# Patient Record
Sex: FEMALE | Race: WHITE | NOT HISPANIC OR LATINO
[De-identification: names, ages, dates, MRNs, and addresses within clinical notes are randomized per-mention and may not be internally consistent; named-entity substitution may affect disease eponyms.]

---

## 2018-05-25 ENCOUNTER — TRANSCRIPTION ENCOUNTER (OUTPATIENT)
Age: 28
End: 2018-05-25

## 2018-10-12 ENCOUNTER — TRANSCRIPTION ENCOUNTER (OUTPATIENT)
Age: 28
End: 2018-10-12

## 2019-01-09 ENCOUNTER — TRANSCRIPTION ENCOUNTER (OUTPATIENT)
Age: 29
End: 2019-01-09

## 2019-01-14 ENCOUNTER — TRANSCRIPTION ENCOUNTER (OUTPATIENT)
Age: 29
End: 2019-01-14

## 2019-02-14 PROBLEM — Z00.00 ENCOUNTER FOR PREVENTIVE HEALTH EXAMINATION: Status: ACTIVE | Noted: 2019-02-14

## 2019-04-22 ENCOUNTER — TRANSCRIPTION ENCOUNTER (OUTPATIENT)
Age: 29
End: 2019-04-22

## 2019-04-22 ENCOUNTER — EMERGENCY (EMERGENCY)
Facility: HOSPITAL | Age: 29
LOS: 0 days | Discharge: HOME | End: 2019-04-22
Attending: EMERGENCY MEDICINE | Admitting: EMERGENCY MEDICINE
Payer: MEDICAID

## 2019-04-22 VITALS
WEIGHT: 119.93 LBS | SYSTOLIC BLOOD PRESSURE: 101 MMHG | OXYGEN SATURATION: 98 % | HEART RATE: 72 BPM | DIASTOLIC BLOOD PRESSURE: 67 MMHG | RESPIRATION RATE: 18 BRPM | TEMPERATURE: 98 F | HEIGHT: 65 IN

## 2019-04-22 DIAGNOSIS — R51 HEADACHE: ICD-10-CM

## 2019-04-22 PROCEDURE — 99284 EMERGENCY DEPT VISIT MOD MDM: CPT

## 2019-04-22 PROCEDURE — 70450 CT HEAD/BRAIN W/O DYE: CPT | Mod: 26

## 2019-04-22 NOTE — ED PROVIDER NOTE - CARE PROVIDER_API CALL
Bowen Mccloud)  Neurology  [address] [place] [address]  Phone: [phone]  Fax: [phone]  Follow Up Time:     John Campos)  Otolaryngology  [address] [place] [address], [address] [place] [id number]  Phone: [phone]  Fax: [phone]  Follow Up Time:

## 2019-04-22 NOTE — ED PROVIDER NOTE - CARE PROVIDERS DIRECT ADDRESSES
,DirectAddress_Unknown,[email or web address]

## 2019-04-22 NOTE — ED PROVIDER NOTE - ATTENDING CONTRIBUTION TO CARE
Pt with right occipital h/a radiating to retroorbital area for years but now with ear pain, referred to ENT but no appointment for a month so urgent care sent to ED for CT, PE as above, imaging reviewed, will d/c to f/u with ENT and neuro. Patient counseled regarding conditions which should prompt return.

## 2019-04-22 NOTE — ED PROVIDER NOTE - NS ED ROS FT
Review of Systems:  	•	CONSTITUTIONAL - no fever, no diaphoresis, no chills  	•	SKIN - no rash  	•	HEMATOLOGIC - no bleeding, no bruising  	•	EYES - no eye pain, no blurry vision  	•	ENT - no change in hearing, no sore throat, no ear pain or tinnitus  	•	RESPIRATORY - no shortness of breath, no cough  	•	CARDIAC - no chest pain, no palpitations  	•	GI - no abd pain, no nausea, no vomiting, no diarrhea, no constipation  	•	GENITO-URINARY - no discharge, no dysuria; no hematuria, no increased urinary frequency  	•	MUSCULOSKELETAL - no joint paint, no swelling, no redness  	•	NEUROLOGIC - no weakness, headache, no paresthesias, no LOC  	•	PSYCH - no anxiety, non suicidal, non homicidal, no hallucination, no depression

## 2019-04-22 NOTE — ED PROVIDER NOTE - PHYSICAL EXAMINATION
--EXAM--  VITAL SIGNS: I have reviewed vs documented at present.  CONSTITUTIONAL: Well-developed; well-nourished; in no acute distress.   SKIN: Warm and dry, no acute rash.   HEAD: Normocephalic; atraumatic.  EYES: PERRL, EOM intact; conjunctiva and sclera clear. No nystagmus.  ENT: No nasal discharge; airway clear.  NECK: Supple; non tender.  CARD: S1, S2, Regular rate and rhythm.   RESP: No wheezes, rales or rhonchi.  ABD: Normal bowel sounds; soft; non-distended; non-tender.  EXT: Normal ROM.   NEURO: Alert, oriented, grossly unremarkable. Strength 5/5 in all extremities. Sensation intact throughout.  PSYCH: Cooperative, appropriate. --EXAM--  VITAL SIGNS: I have reviewed vs documented at present.  CONSTITUTIONAL: Well-developed; well-nourished; in no acute distress.   SKIN: Warm and dry, no acute rash.   HEAD: Normocephalic; atraumatic.  EYES: PERRL, EOM intact; conjunctiva and sclera clear. No nystagmus.  ENT: No nasal discharge; airway clear, TMs normal  NECK: Supple; non tender.  CARD: S1, S2, Regular rate and rhythm.   RESP: No wheezes, rales or rhonchi.  ABD: Normal bowel sounds; soft; non-distended; non-tender.  EXT: Normal ROM.   NEURO: Alert, oriented, grossly unremarkable. Strength 5/5 in all extremities. Sensation intact throughout.  PSYCH: Cooperative, appropriate.

## 2019-04-22 NOTE — ED PROVIDER NOTE - OBJECTIVE STATEMENT
this is a 28 yo female presents to ed for evaluation of headache. patient states that she has had headaches for years. patient went to urgent care and was told to come to ed for further evaluation

## 2019-04-22 NOTE — ED ADULT NURSE NOTE - NSIMPLEMENTINTERV_GEN_ALL_ED
Implemented All Universal Safety Interventions:  [place] to call system. Call bell, personal items and telephone within reach. Instruct patient to call for assistance. Room bathroom lighting operational. Non-slip footwear when patient is off stretcher. Physically safe environment: no spills, clutter or unnecessary equipment. Stretcher in lowest position, wheels locked, appropriate side rails in place.

## 2019-04-23 ENCOUNTER — APPOINTMENT (OUTPATIENT)
Dept: NEPHROLOGY | Facility: CLINIC | Age: 29
End: 2019-04-23

## 2019-05-07 ENCOUNTER — OTHER (OUTPATIENT)
Age: 29
End: 2019-05-07

## 2019-05-17 ENCOUNTER — TRANSCRIPTION ENCOUNTER (OUTPATIENT)
Age: 29
End: 2019-05-17

## 2019-05-17 ENCOUNTER — APPOINTMENT (OUTPATIENT)
Dept: PULMONOLOGY | Facility: CLINIC | Age: 29
End: 2019-05-17

## 2019-06-17 ENCOUNTER — TRANSCRIPTION ENCOUNTER (OUTPATIENT)
Age: 29
End: 2019-06-17

## 2019-08-01 PROBLEM — R51 HEADACHE: Chronic | Status: ACTIVE | Noted: 2019-04-22

## 2019-08-16 ENCOUNTER — TRANSCRIPTION ENCOUNTER (OUTPATIENT)
Age: 29
End: 2019-08-16

## 2019-10-07 ENCOUNTER — APPOINTMENT (OUTPATIENT)
Dept: OTOLARYNGOLOGY | Facility: CLINIC | Age: 29
End: 2019-10-07

## 2019-11-15 ENCOUNTER — OUTPATIENT (OUTPATIENT)
Dept: OUTPATIENT SERVICES | Facility: HOSPITAL | Age: 29
LOS: 1 days | Discharge: HOME | End: 2019-11-15

## 2019-11-15 ENCOUNTER — APPOINTMENT (OUTPATIENT)
Dept: GYNECOLOGIC ONCOLOGY | Facility: CLINIC | Age: 29
End: 2019-11-15
Payer: MEDICAID

## 2019-11-15 DIAGNOSIS — Z78.9 OTHER SPECIFIED HEALTH STATUS: ICD-10-CM

## 2019-11-15 PROCEDURE — 99205 OFFICE O/P NEW HI 60 MIN: CPT

## 2019-11-15 NOTE — ASSESSMENT
[FreeTextEntry1] : 28yo P3 with complex ovarian cyst and elevated ,\par \par -repeat  with TVUS\par -RTC in 2wks\par -if persistent cyst with rising  consider laparoscopic cystectomy

## 2019-11-15 NOTE — HISTORY OF PRESENT ILLNESS
[FreeTextEntry1] : 28yo P3 with elevated CA-125 and persistent L ovarian cyst.\par \par Imaging:\par 7/29/19 nl L and R ovary without cyst\par 8/6/19 L ovarian complex cyst 1.8x2.5x0.74cm\par 10/3/19 L ovarian cyst [id number], septated, low level echoes, consistent with cystadnoma\par \par Labwork:\par 7/18/19  49.4\par 9/20/19  71.9

## 2019-11-16 LAB — CANCER AG125 SERPL-ACNC: 84 U/ML

## 2019-11-19 ENCOUNTER — OUTPATIENT (OUTPATIENT)
Dept: OUTPATIENT SERVICES | Facility: HOSPITAL | Age: 29
LOS: 1 days | Discharge: HOME | End: 2019-11-19
Payer: MEDICAID

## 2019-11-19 DIAGNOSIS — N83.209 UNSPECIFIED OVARIAN CYST, UNSPECIFIED SIDE: ICD-10-CM

## 2019-11-19 PROCEDURE — 76830 TRANSVAGINAL US NON-OB: CPT | Mod: 26

## 2019-11-20 DIAGNOSIS — N83.209 UNSPECIFIED OVARIAN CYST, UNSPECIFIED SIDE: ICD-10-CM

## 2019-11-29 ENCOUNTER — OUTPATIENT (OUTPATIENT)
Dept: OUTPATIENT SERVICES | Facility: HOSPITAL | Age: 29
LOS: 1 days | Discharge: HOME | End: 2019-11-29

## 2019-11-29 ENCOUNTER — APPOINTMENT (OUTPATIENT)
Dept: GYNECOLOGIC ONCOLOGY | Facility: CLINIC | Age: 29
End: 2019-11-29
Payer: MEDICAID

## 2019-11-29 VITALS
WEIGHT: 126 LBS | HEIGHT: 65 IN | DIASTOLIC BLOOD PRESSURE: 62 MMHG | BODY MASS INDEX: 20.99 KG/M2 | HEART RATE: 76 BPM | RESPIRATION RATE: 14 BRPM | SYSTOLIC BLOOD PRESSURE: 106 MMHG

## 2019-11-29 DIAGNOSIS — N83.209 UNSPECIFIED OVARIAN CYST, UNSPECIFIED SIDE: ICD-10-CM

## 2019-11-29 PROCEDURE — 99214 OFFICE O/P EST MOD 30 MIN: CPT

## 2019-11-29 NOTE — HISTORY OF PRESENT ILLNESS
[FreeTextEntry1] : 28yo P3 with elevated CA-125 and persistent L ovarian cyst here for follow up\par \par Imaging:\par 7/29/19 nl L and R ovary without cyst\par 8/6/19 L ovarian complex cyst 1.8x2.5x0.74cm\par 10/3/19 L ovarian cyst [id number], septated, low level echoes, consistent with cystadenoma\par 11/20/19 L ovarian cyst 2cm with left ovarian hypoechoic structure 2.9cm\par \par Labwork:\par 7/18/19  49.4\par 9/20/19  71.9\par 11/15/19  84

## 2019-11-29 NOTE — ASSESSMENT
[FreeTextEntry1] : 30yo P3 with complex ovarian cyst and elevated , likely endometriosis\par \par -R/B/A of diagnostic laparoscopy with left ovarian cystectomy discussed. All questions answered. Scheduled for 1/20/2020\par -PST

## 2019-12-03 DIAGNOSIS — N83.209 UNSPECIFIED OVARIAN CYST, UNSPECIFIED SIDE: ICD-10-CM

## 2019-12-06 ENCOUNTER — TRANSCRIPTION ENCOUNTER (OUTPATIENT)
Age: 29
End: 2019-12-06

## 2019-12-10 ENCOUNTER — APPOINTMENT (OUTPATIENT)
Dept: GYNECOLOGIC ONCOLOGY | Facility: CLINIC | Age: 29
End: 2019-12-10

## 2019-12-17 ENCOUNTER — APPOINTMENT (OUTPATIENT)
Dept: OTOLARYNGOLOGY | Facility: CLINIC | Age: 29
End: 2019-12-17
Payer: MEDICAID

## 2019-12-17 VITALS
WEIGHT: 126 LBS | DIASTOLIC BLOOD PRESSURE: 77 MMHG | HEIGHT: 65 IN | BODY MASS INDEX: 20.99 KG/M2 | SYSTOLIC BLOOD PRESSURE: 110 MMHG

## 2019-12-17 DIAGNOSIS — J00 ACUTE NASOPHARYNGITIS [COMMON COLD]: ICD-10-CM

## 2019-12-17 DIAGNOSIS — R51 HEADACHE: ICD-10-CM

## 2019-12-17 DIAGNOSIS — R42 DIZZINESS AND GIDDINESS: ICD-10-CM

## 2019-12-17 DIAGNOSIS — H92.09 OTALGIA, UNSPECIFIED EAR: ICD-10-CM

## 2019-12-17 PROCEDURE — 31231 NASAL ENDOSCOPY DX: CPT

## 2019-12-17 PROCEDURE — 99204 OFFICE O/P NEW MOD 45 MIN: CPT | Mod: 25

## 2019-12-17 PROCEDURE — 92550 TYMPANOMETRY & REFLEX THRESH: CPT

## 2019-12-17 PROCEDURE — 92557 COMPREHENSIVE HEARING TEST: CPT

## 2019-12-17 NOTE — PHYSICAL EXAM
[Midline] : trachea located in midline position [Normal] : no rashes [] : Midkiff-Hallpike test is negative

## 2019-12-17 NOTE — HISTORY OF PRESENT ILLNESS
[de-identified] : 29 Year old female here today as a new patient c/o otalgia, dizziness and ear popping. it has been going on for a few months. spinning and imbalance. \par Pt completed audiogram today; results. Patient admits otalgia for 1 year on and off. She has improvement with nasal spray. \par \par She also complains of right sided nasal blockage. runny nose at times, clear. \par \par Patient was diagnosed with a cervical herniated disk.

## 2020-01-06 ENCOUNTER — OUTPATIENT (OUTPATIENT)
Dept: OUTPATIENT SERVICES | Facility: HOSPITAL | Age: 30
LOS: 1 days | Discharge: HOME | End: 2020-01-06
Payer: MEDICAID

## 2020-01-06 VITALS
SYSTOLIC BLOOD PRESSURE: 129 MMHG | RESPIRATION RATE: 16 BRPM | OXYGEN SATURATION: 100 % | TEMPERATURE: 98 F | DIASTOLIC BLOOD PRESSURE: 56 MMHG | WEIGHT: 121.47 LBS | HEART RATE: 64 BPM

## 2020-01-06 DIAGNOSIS — N83.209 UNSPECIFIED OVARIAN CYST, UNSPECIFIED SIDE: ICD-10-CM

## 2020-01-06 DIAGNOSIS — Z01.818 ENCOUNTER FOR OTHER PREPROCEDURAL EXAMINATION: ICD-10-CM

## 2020-01-06 LAB
ALBUMIN SERPL ELPH-MCNC: 4.3 G/DL — SIGNIFICANT CHANGE UP (ref 3.5–5.2)
ALP SERPL-CCNC: 70 U/L — SIGNIFICANT CHANGE UP (ref 30–115)
ALT FLD-CCNC: 6 U/L — SIGNIFICANT CHANGE UP (ref 0–41)
ANION GAP SERPL CALC-SCNC: 13 MMOL/L — SIGNIFICANT CHANGE UP (ref 7–14)
APPEARANCE UR: CLEAR — SIGNIFICANT CHANGE UP
APTT BLD: 32.9 SEC — SIGNIFICANT CHANGE UP (ref 27–39.2)
AST SERPL-CCNC: 10 U/L — SIGNIFICANT CHANGE UP (ref 0–41)
BACTERIA # UR AUTO: NEGATIVE — SIGNIFICANT CHANGE UP
BASOPHILS # BLD AUTO: 0.04 K/UL — SIGNIFICANT CHANGE UP (ref 0–0.2)
BASOPHILS NFR BLD AUTO: 0.7 % — SIGNIFICANT CHANGE UP (ref 0–1)
BILIRUB SERPL-MCNC: 0.4 MG/DL — SIGNIFICANT CHANGE UP (ref 0.2–1.2)
BILIRUB UR-MCNC: NEGATIVE — SIGNIFICANT CHANGE UP
BLD GP AB SCN SERPL QL: SIGNIFICANT CHANGE UP
BUN SERPL-MCNC: 9 MG/DL — LOW (ref 10–20)
CALCIUM SERPL-MCNC: 9.3 MG/DL — SIGNIFICANT CHANGE UP (ref 8.5–10.1)
CHLORIDE SERPL-SCNC: 102 MMOL/L — SIGNIFICANT CHANGE UP (ref 98–110)
CO2 SERPL-SCNC: 26 MMOL/L — SIGNIFICANT CHANGE UP (ref 17–32)
COLOR SPEC: YELLOW — SIGNIFICANT CHANGE UP
CREAT SERPL-MCNC: 0.7 MG/DL — SIGNIFICANT CHANGE UP (ref 0.7–1.5)
DIFF PNL FLD: ABNORMAL
EOSINOPHIL # BLD AUTO: 0.06 K/UL — SIGNIFICANT CHANGE UP (ref 0–0.7)
EOSINOPHIL NFR BLD AUTO: 1 % — SIGNIFICANT CHANGE UP (ref 0–8)
EPI CELLS # UR: 6 /HPF — HIGH (ref 0–5)
GLUCOSE SERPL-MCNC: 92 MG/DL — SIGNIFICANT CHANGE UP (ref 70–99)
GLUCOSE UR QL: NEGATIVE — SIGNIFICANT CHANGE UP
HCT VFR BLD CALC: 34 % — LOW (ref 37–47)
HGB BLD-MCNC: 11.2 G/DL — LOW (ref 12–16)
HYALINE CASTS # UR AUTO: 1 /LPF — SIGNIFICANT CHANGE UP (ref 0–7)
IMM GRANULOCYTES NFR BLD AUTO: 0.2 % — SIGNIFICANT CHANGE UP (ref 0.1–0.3)
INR BLD: 1.1 RATIO — SIGNIFICANT CHANGE UP (ref 0.65–1.3)
KETONES UR-MCNC: NEGATIVE — SIGNIFICANT CHANGE UP
LEUKOCYTE ESTERASE UR-ACNC: ABNORMAL
LYMPHOCYTES # BLD AUTO: 1.87 K/UL — SIGNIFICANT CHANGE UP (ref 1.2–3.4)
LYMPHOCYTES # BLD AUTO: 32 % — SIGNIFICANT CHANGE UP (ref 20.5–51.1)
MCHC RBC-ENTMCNC: 30 PG — SIGNIFICANT CHANGE UP (ref 27–31)
MCHC RBC-ENTMCNC: 32.9 G/DL — SIGNIFICANT CHANGE UP (ref 32–37)
MCV RBC AUTO: 91.2 FL — SIGNIFICANT CHANGE UP (ref 81–99)
MONOCYTES # BLD AUTO: 0.43 K/UL — SIGNIFICANT CHANGE UP (ref 0.1–0.6)
MONOCYTES NFR BLD AUTO: 7.4 % — SIGNIFICANT CHANGE UP (ref 1.7–9.3)
NEUTROPHILS # BLD AUTO: 3.44 K/UL — SIGNIFICANT CHANGE UP (ref 1.4–6.5)
NEUTROPHILS NFR BLD AUTO: 58.7 % — SIGNIFICANT CHANGE UP (ref 42.2–75.2)
NITRITE UR-MCNC: NEGATIVE — SIGNIFICANT CHANGE UP
NRBC # BLD: 0 /100 WBCS — SIGNIFICANT CHANGE UP (ref 0–0)
PH UR: 6 — SIGNIFICANT CHANGE UP (ref 5–8)
PLATELET # BLD AUTO: 305 K/UL — SIGNIFICANT CHANGE UP (ref 130–400)
POTASSIUM SERPL-MCNC: 3.9 MMOL/L — SIGNIFICANT CHANGE UP (ref 3.5–5)
POTASSIUM SERPL-SCNC: 3.9 MMOL/L — SIGNIFICANT CHANGE UP (ref 3.5–5)
PROT SERPL-MCNC: 7.2 G/DL — SIGNIFICANT CHANGE UP (ref 6–8)
PROT UR-MCNC: SIGNIFICANT CHANGE UP
PROTHROM AB SERPL-ACNC: 12.6 SEC — SIGNIFICANT CHANGE UP (ref 9.95–12.87)
RBC # BLD: 3.73 M/UL — LOW (ref 4.2–5.4)
RBC # FLD: 12.2 % — SIGNIFICANT CHANGE UP (ref 11.5–14.5)
RBC CASTS # UR COMP ASSIST: 3 /HPF — SIGNIFICANT CHANGE UP (ref 0–4)
SODIUM SERPL-SCNC: 141 MMOL/L — SIGNIFICANT CHANGE UP (ref 135–146)
SP GR SPEC: 1.03 — HIGH (ref 1.01–1.02)
UROBILINOGEN FLD QL: SIGNIFICANT CHANGE UP
WBC # BLD: 5.85 K/UL — SIGNIFICANT CHANGE UP (ref 4.8–10.8)
WBC # FLD AUTO: 5.85 K/UL — SIGNIFICANT CHANGE UP (ref 4.8–10.8)
WBC UR QL: 9 /HPF — HIGH (ref 0–5)

## 2020-01-06 PROCEDURE — 93010 ELECTROCARDIOGRAM REPORT: CPT

## 2020-01-06 NOTE — H&P PST ADULT - NSANTHOSAYNRD_GEN_A_CORE
No. JERAD screening performed.  STOP BANG Legend: 0-2 = LOW Risk; 3-4 = INTERMEDIATE Risk; 5-8 = HIGH Risk

## 2020-01-06 NOTE — H&P PST ADULT - BLOOD AVOIDANCE/RESTRICTIONS, PROFILE
Diet: Diabetes  Food is an important tool that you can use to control diabetes and stay healthy. Eating well-balanced meals in the correct amounts will help you control your blood glucose levels and prevent low blood sugar reactions. It will also help you reduce the health risks of diabetes. There is no one specific diet that is right for everyone with diabetes. But there are general guidelines to follow. A registered dietitian (RD) will create a tailored diet approach thats just right for you. He or she will also help you plan healthy meals and snacks. If you have any questions, call your dietitian for advice.    Guidelines for success  Talk with your healthcare provider before starting a diabetes diet or weight loss program. If you haven't talked with a dietitian yet, ask your provider for a referral. The following guidelines can help you succeed:  · Select foods from the 6 food groups below. Your dietitian will help you find food choices within each group. He or she will also show you serving sizes and how many servings you can have at each meal.  ¨ Grains, beans, and starchy vegetables  ¨ Vegetables  ¨ Fruit  ¨ Milk or yogurt  ¨ Meat, poultry, fish, or tofu  ¨ Healthy fats  · Check your blood sugar levels as directed by your provider. Take any medicine as prescribed by your provider.  · Learn to read food labels and pick the right portion sizes.  · Eat only the amount of food in your meal plan. Eat about the same amount of food at regular times each day. Dont skip meals. Eat meals 4 to 5 hours apart, with snacks in between.  · Limit alcohol. It raises blood sugar levels. Drink water or calorie-free diet drinks that use safe sweeteners.  · Eat less fat to help lower your risk of heart disease. Use nonfat or low-fat dairy products and lean meats. Avoid fried foods. Use cooking oils that are unsaturated, such as olive, canola, or peanut oil.  · Talk with your dietitian about safe sugar substitutes.  · Avoid  added salt. It can contribute to high blood pressure, which can cause heart disease. People with diabetes already have a risk of high blood pressure and heart disease.  · Stay at a healthy weight. If you need to lose weight, cut down on your portion sizes. But dont skip meals. Exercise is an important part of any weight management program. Talk with your provider about an exercise program thats right for you.  · For more information about the best diet plan for you, talk with a registered dietitian (RD). To find an RD in your area, contact:  ¨ Academy of Nutrition and Dietetics www.eatright.org  ¨ The American Diabetes Association [phone] www.diabetes.org  Date Last Reviewed: 8/1/2016 © 2000-2016 [employer]. [address] [place] [address], [place] [address]. All rights reserved. This information is not intended as a substitute for professional medical care. Always follow your healthcare professional's instructions.         none

## 2020-01-06 NOTE — H&P PST ADULT - RS GEN PE MLT RESP DETAILS PC
airway patent/normal/breath sounds equal/respirations non-labored/no chest wall tenderness/good air movement/clear to auscultation bilaterally

## 2020-01-06 NOTE — H&P PST ADULT - REASON FOR ADMISSION
PT PRESENTS TO PAST WITH NO SOB, CP, PALPITATIONS, DYSURIA, UTI OR URI AT PRESENT.   PT ABLE TO WALK UP 2-3 FLIGHTS OF STEPS WITH NO SOB.  AS PER THE PT, THIS IS HIS/HER COMPLETE MEDICAL AND SURGICAL HX, INCLUDING MEDICATIONS PRESCRIBED AND OVER THE COUNTER  PT PRESENTS TO PAST WITH H/O--OVARIAN CYST.

## 2020-01-16 PROBLEM — M54.2 CERVICALGIA: Chronic | Status: ACTIVE | Noted: 2020-01-06

## 2020-01-16 PROBLEM — D64.9 ANEMIA, UNSPECIFIED: Chronic | Status: ACTIVE | Noted: 2020-01-06

## 2020-01-20 ENCOUNTER — OUTPATIENT (OUTPATIENT)
Dept: OUTPATIENT SERVICES | Facility: HOSPITAL | Age: 30
LOS: 1 days | Discharge: HOME | End: 2020-01-20
Payer: MEDICAID

## 2020-01-20 ENCOUNTER — RESULT REVIEW (OUTPATIENT)
Age: 30
End: 2020-01-20

## 2020-01-20 VITALS
DIASTOLIC BLOOD PRESSURE: 78 MMHG | SYSTOLIC BLOOD PRESSURE: 99 MMHG | HEART RATE: 80 BPM | TEMPERATURE: 98 F | WEIGHT: 123.9 LBS | RESPIRATION RATE: 16 BRPM | HEIGHT: 65 IN

## 2020-01-20 VITALS
DIASTOLIC BLOOD PRESSURE: 74 MMHG | HEART RATE: 90 BPM | SYSTOLIC BLOOD PRESSURE: 126 MMHG | OXYGEN SATURATION: 97 % | RESPIRATION RATE: 21 BRPM

## 2020-01-20 LAB — ABO RH CONFIRMATION: SIGNIFICANT CHANGE UP

## 2020-01-20 PROCEDURE — 58925 REMOVAL OF OVARIAN CYST(S): CPT

## 2020-01-20 PROCEDURE — 88305 TISSUE EXAM BY PATHOLOGIST: CPT | Mod: 26

## 2020-01-20 PROCEDURE — 88304 TISSUE EXAM BY PATHOLOGIST: CPT | Mod: 26

## 2020-01-20 RX ORDER — HYDROMORPHONE HYDROCHLORIDE 2 MG/ML
0.5 INJECTION INTRAMUSCULAR; INTRAVENOUS; SUBCUTANEOUS
Refills: 0 | Status: DISCONTINUED | OUTPATIENT
Start: 2020-01-20 | End: 2020-01-20

## 2020-01-20 RX ORDER — METOCLOPRAMIDE HCL 10 MG
5 TABLET ORAL ONCE
Refills: 0 | Status: DISCONTINUED | OUTPATIENT
Start: 2020-01-20 | End: 2020-02-04

## 2020-01-20 RX ORDER — SODIUM CHLORIDE 9 MG/ML
1000 INJECTION, SOLUTION INTRAVENOUS
Refills: 0 | Status: DISCONTINUED | OUTPATIENT
Start: 2020-01-20 | End: 2020-01-20

## 2020-01-20 RX ORDER — IBUPROFEN 200 MG
1 TABLET ORAL
Qty: 0 | Refills: 0 | DISCHARGE

## 2020-01-20 RX ORDER — ONDANSETRON 8 MG/1
4 TABLET, FILM COATED ORAL
Refills: 0 | Status: DISCONTINUED | OUTPATIENT
Start: 2020-01-20 | End: 2020-01-20

## 2020-01-20 RX ORDER — IBUPROFEN 200 MG
1 TABLET ORAL
Qty: 28 | Refills: 0
Start: 2020-01-20 | End: 2020-01-26

## 2020-01-20 RX ORDER — DEXAMETHASONE 0.5 MG/5ML
8 ELIXIR ORAL ONCE
Refills: 0 | Status: COMPLETED | OUTPATIENT
Start: 2020-01-20 | End: 2020-01-20

## 2020-01-20 RX ORDER — MEPERIDINE HYDROCHLORIDE 50 MG/ML
12.5 INJECTION INTRAMUSCULAR; INTRAVENOUS; SUBCUTANEOUS
Refills: 0 | Status: DISCONTINUED | OUTPATIENT
Start: 2020-01-20 | End: 2020-01-20

## 2020-01-20 RX ORDER — HYDROMORPHONE HYDROCHLORIDE 2 MG/ML
1 INJECTION INTRAMUSCULAR; INTRAVENOUS; SUBCUTANEOUS
Refills: 0 | Status: DISCONTINUED | OUTPATIENT
Start: 2020-01-20 | End: 2020-01-20

## 2020-01-20 RX ADMIN — MEPERIDINE HYDROCHLORIDE 12.5 MILLIGRAM(S): 50 INJECTION INTRAMUSCULAR; INTRAVENOUS; SUBCUTANEOUS at 18:30

## 2020-01-20 RX ADMIN — ONDANSETRON 4 MILLIGRAM(S): 8 TABLET, FILM COATED ORAL at 19:42

## 2020-01-20 RX ADMIN — SODIUM CHLORIDE 125 MILLILITER(S): 9 INJECTION, SOLUTION INTRAVENOUS at 18:31

## 2020-01-20 RX ADMIN — Medication 101.6 MILLIGRAM(S): at 20:24

## 2020-01-20 RX ADMIN — HYDROMORPHONE HYDROCHLORIDE 1 MILLIGRAM(S): 2 INJECTION INTRAMUSCULAR; INTRAVENOUS; SUBCUTANEOUS at 18:43

## 2020-01-20 RX ADMIN — HYDROMORPHONE HYDROCHLORIDE 1 MILLIGRAM(S): 2 INJECTION INTRAMUSCULAR; INTRAVENOUS; SUBCUTANEOUS at 18:49

## 2020-01-20 RX ADMIN — MEPERIDINE HYDROCHLORIDE 12.5 MILLIGRAM(S): 50 INJECTION INTRAMUSCULAR; INTRAVENOUS; SUBCUTANEOUS at 18:43

## 2020-01-20 NOTE — BRIEF OPERATIVE NOTE - NSICDXBRIEFPOSTOP_GEN_ALL_CORE_FT
POST-OP DIAGNOSIS:  Cyst of fallopian tube 20-Jan-2020 17:55:41 Right Edin Brink  Endometrioma of ovary 20-Jan-2020 17:55:26 left Edin Brink

## 2020-01-20 NOTE — BRIEF OPERATIVE NOTE - OPERATION/FINDINGS
Left ovarian endometrioma, 3cm sized, right fallopian tube cyst, 1cm. Endometriosis noted in pelvis with implants on bilateral ovaries and cul de sac

## 2020-01-20 NOTE — ASU DISCHARGE PLAN (ADULT/PEDIATRIC) - ACTIVITY LEVEL
Weight bearing as tolerated/For 2 weeks, nothing in the vagina. No sex, tampons, or douching. No swimming pools or tub baths. May shower per usual. If you have fever higher than 100.4, severe abdominal pain, or heavy vaginal bleeding, call your doctor or return to the emergency room.

## 2020-01-20 NOTE — CHART NOTE - NSCHARTNOTEFT_GEN_A_CORE
PACU ANESTHESIA ADMISSION NOTE      Procedure: Excision, cyst, fallopian tube, laparoscopic  Laparoscopic cystectomy: Robot assisted    Post op diagnosis:  Cyst of fallopian tube  Endometrioma of ovary      ____  Intubated  TV:______       Rate: ______      FiO2: ______    _x___  Patent Airway    _x___  Full return of protective reflexes    _x___  Full recovery from anesthesia / back to baseline status    Vitals:  T(F): 97.6   HR: 79  BP: 96/53  RR: 14  SpO2: 100%    Mental Status:  _x___ Awake   __x___ Alert   _____ Drowsy   _____ Sedated    Nausea/Vomiting:  _x___  NO       ______Yes,   See Post - Op Orders         Pain Scale (0-10):  __0___    Treatment: _x___ None    ____ See Post - Op/PCA Orders    Post - Operative Fluids:   __x__ Oral   ____ See Post - Op Orders    Plan: Discharge:   _x___Home       _____Floor     _____Critical Care    _____  Other:_________________    Comments:  No anesthesia issues or complications noted.  Discharge when criteria met.

## 2020-01-20 NOTE — ASU DISCHARGE PLAN (ADULT/PEDIATRIC) - CARE PROVIDER_API CALL
Sahil Tucker)  Gynecologic Oncology; Obstetrics and Gynecology  [address] [place] [address]  Phone: [phone]  Fax: [phone]  Follow Up Time: 2 weeks

## 2020-01-20 NOTE — BRIEF OPERATIVE NOTE - NSICDXBRIEFPROCEDURE_GEN_ALL_CORE_FT
PROCEDURES:  Excision, cyst, fallopian tube, laparoscopic 20-Jan-2020 17:54:25  Edin Brink  Laparoscopic cystectomy 20-Jan-2020 17:54:17 Robot assisted Edin Brink

## 2020-01-20 NOTE — ASU DISCHARGE PLAN (ADULT/PEDIATRIC) - CALL YOUR DOCTOR IF YOU HAVE ANY OF THE FOLLOWING:
Fever greater than (need to indicate Fahrenheit or Celsius)/Wound/Surgical Site with redness, or foul smelling discharge or pus/Nausea and vomiting that does not stop/Unable to urinate/Bleeding that does not stop

## 2020-01-20 NOTE — PRE-ANESTHESIA EVALUATION ADULT - NSANTHADDINFOFT_GEN_ALL_CORE
29 y/o WF evaluated for robotically-assisted ovarian cystectomy.  ASA 1.  Plan GA.  Plan, benefits, foreseeable risks, viable alternatives discussed with patient and all her pertinent questions answered and she understands and elects to proceed.

## 2020-01-20 NOTE — ASU PATIENT PROFILE, ADULT - REASON FOR ADMISSION, PROFILE
Diagnostic laparascopy, laparascopic left ovarian cystectomy, possible right ovarian cystectomy, possible left or right salpingooophorectomy

## 2020-01-24 ENCOUNTER — APPOINTMENT (OUTPATIENT)
Dept: OTOLARYNGOLOGY | Facility: CLINIC | Age: 30
End: 2020-01-24

## 2020-01-24 LAB — SURGICAL PATHOLOGY STUDY: SIGNIFICANT CHANGE UP

## 2020-01-25 DIAGNOSIS — N83.02 FOLLICULAR CYST OF LEFT OVARY: ICD-10-CM

## 2020-01-25 DIAGNOSIS — N80.3 ENDOMETRIOSIS OF PELVIC PERITONEUM: ICD-10-CM

## 2020-01-25 DIAGNOSIS — N80.1 ENDOMETRIOSIS OF OVARY: ICD-10-CM

## 2020-01-25 DIAGNOSIS — N83.209 UNSPECIFIED OVARIAN CYST, UNSPECIFIED SIDE: ICD-10-CM

## 2020-01-25 DIAGNOSIS — D64.9 ANEMIA, UNSPECIFIED: ICD-10-CM

## 2020-01-25 DIAGNOSIS — N83.8 OTHER NONINFLAMMATORY DISORDERS OF OVARY, FALLOPIAN TUBE AND BROAD LIGAMENT: ICD-10-CM

## 2020-01-25 DIAGNOSIS — N73.6 FEMALE PELVIC PERITONEAL ADHESIONS (POSTINFECTIVE): ICD-10-CM

## 2020-02-07 ENCOUNTER — OUTPATIENT (OUTPATIENT)
Dept: OUTPATIENT SERVICES | Facility: HOSPITAL | Age: 30
LOS: 1 days | Discharge: HOME | End: 2020-02-07

## 2020-02-07 ENCOUNTER — APPOINTMENT (OUTPATIENT)
Dept: GYNECOLOGIC ONCOLOGY | Facility: CLINIC | Age: 30
End: 2020-02-07
Payer: MEDICAID

## 2020-02-07 VITALS
RESPIRATION RATE: 14 BRPM | HEART RATE: 86 BPM | SYSTOLIC BLOOD PRESSURE: 99 MMHG | WEIGHT: 127 LBS | BODY MASS INDEX: 21.16 KG/M2 | DIASTOLIC BLOOD PRESSURE: 63 MMHG | TEMPERATURE: 97.4 F | HEIGHT: 65 IN

## 2020-02-07 DIAGNOSIS — N80.9 ENDOMETRIOSIS, UNSPECIFIED: ICD-10-CM

## 2020-02-07 PROCEDURE — 99024 POSTOP FOLLOW-UP VISIT: CPT

## 2020-02-07 NOTE — REASON FOR VISIT
[de-identified] : 1/20/20 [de-identified] : RA laparoscopic ovarian cystectomy, excision of fallopian tube cyst [de-identified] : Findings: Left ovarian endometrioma, 3cm sized, right fallopian tube cyst, 1cm. Endometriosis noted in pelvis with implants on bilateral ovaries and cul de sac

## 2020-02-07 NOTE — DISCUSSION/SUMMARY
[Clean] : was clean [Dry] : was dry [Intact] : was intact [Normal Skin Turgor] : normal skin turgor [Normal Skin] : normal appearance [None] : had no drainage [Doing Well] : is doing well [Diagnosis/Stage ___] : Given this data, a diagnosis of [unfilled] is rendered. [Ecchymosis] : was not ecchymotic [Erythema] : was not erythematous [Seroma] : had no seroma [Tender] : nontender [Firm] : soft [Abnormal Bowel Sounds] : normal bowel sounds [Rebound] : no rebound tenderness [Guarding] : no guarding [Incisional Hernia] : no incisional hernia [Mass] : no palpable mass [de-identified] : as tolerated [FreeTextEntry1] : \par ACCESSION No:  [id number]\par MONCHO AUGUSTIN                          2\par \par Surgical Final Report\par \par Final Diagnosis\par 1. Right paratubal cyst, removal:\par - Minute fragment of benign fallopian tube with benign paratubal\par cyst.\par \par 2. Left ovarian endometrioma, cystectomy:\par - Fragments of benign ovarian endometriosis (endometrioma) and\par benign follicular cyst.\par \par Verified by: Samaria Kelley M.D.\par (Electronic Signature)\par Reported on: 01/24/20 20:04 EST, [address] [place] [address] [place] [address]\par Phone: [phone]   Fax: [phone]\par _________________________________________________________________\par \par Clinical History\par Robotic assisted left ovarian cystectomy and right paratubal cyst\par removal\par \par Specimen(s) Submitted\par 1     Right para tubal cyst\par 2     Left ovarian endometrioma\par \par Gross Description\par 1.  The specimen is received in formalin, labeled "right\par paratubal cyst", and consists of a fragment of pink to red soft\par tissue, measuring 1.1 x 1 x 0.7 cm.  The external surfaces is\par inked black.  The specimen is serially sectioned.  The specimen\par is submitted entirely. (1 block).\par \par 2.  The specimen is received in formalin, labeled "left ovarian\par endometrioma", and consists of an unoriented membranous piece of\par tissue, measuring 5 x 1.5 x 0.2 cm.  On one end there is a small\par yellowish orange area of possibly ovarian tissue.  One side of\par the membrane is inked black, the other side is inked blue.  The\par specimen is serially sectioned and entirely submitted.  (3\par blocks)\par \par Specimen was received and underwent gross examination at [facility], [address] [place] [address],\par [place] [address].\par \par 01/21/20 11:31 gm\par \par Perioperative Diagnosis\par Ovarian cyst\par \par MONCHO AUGUSTIN                          2\par Surgical Final Report\par

## 2020-02-07 NOTE — ASSESSMENT
[FreeTextEntry1] : 30 s/p RA lap cystectomy and paratubal cyst excision, benign\par -f/u with primary obgyn Dr. Damian for routine care, can consider taking Orlissa to suppress hormone production to help prevent recurrence then can change to aygestin after 6 months\par -all questions answered

## 2020-02-11 DIAGNOSIS — N80.9 ENDOMETRIOSIS, UNSPECIFIED: ICD-10-CM

## 2020-08-28 ENCOUNTER — APPOINTMENT (OUTPATIENT)
Dept: GYNECOLOGIC ONCOLOGY | Facility: CLINIC | Age: 30
End: 2020-08-28
Payer: MEDICAID

## 2020-08-28 ENCOUNTER — OUTPATIENT (OUTPATIENT)
Dept: OUTPATIENT SERVICES | Facility: HOSPITAL | Age: 30
LOS: 1 days | Discharge: HOME | End: 2020-08-28

## 2020-08-28 VITALS
TEMPERATURE: 98.1 F | WEIGHT: 126 LBS | SYSTOLIC BLOOD PRESSURE: 112 MMHG | HEART RATE: 70 BPM | RESPIRATION RATE: 14 BRPM | HEIGHT: 65 IN | DIASTOLIC BLOOD PRESSURE: 68 MMHG | BODY MASS INDEX: 20.99 KG/M2

## 2020-08-28 DIAGNOSIS — N80.9 ENDOMETRIOSIS, UNSPECIFIED: ICD-10-CM

## 2020-08-28 PROCEDURE — 99214 OFFICE O/P EST MOD 30 MIN: CPT

## 2020-08-28 NOTE — ASSESSMENT
[FreeTextEntry1] : 31yo with endometriosis s/p RA laparoscopic left ovarian cystectomy, right paratubal cystectomy on 1/20/20, now with new left sided ovarian cyst with elevated CA-125 and CA 19-9 \par \par - records of TVUS from PMD's office unavailable\par - discussed with patient that since symptoms have improved with OCPs, to continue taking them. \par - advised repeat TVUS in 3 months. If cyst enlarging or pain worsening, will schedule for left oophorectomy \par - health maintenance \par - can f/u with GYN; RTC prn

## 2020-08-28 NOTE — REASON FOR VISIT
[FreeTextEntry1] : 31yo here for followup. \par \par Diagnosis: endometriosis \par Surgery (1/20/20): RA laparoscopic left ovarian cystectomy, right paratubal cystectomy \par \par Denies acute complaints today. Reports left sided abdominal pain, worse during her menses. States she only starting taking OCPs two months ago. However OCPs have significantly improved her pain. Had ultrasound in June showing new ovarian cyst with elevated CA-125/CA 19-9. Denies fever, chills, vomiting, vaginal bleeding, abdominal pain, weight loss/loss of appetite, urinary incontinence, change in bowel habits, hematuria.  \par \par Labs: 6/27/20: : 80.7; CA 19-9: 37.1

## 2020-09-01 DIAGNOSIS — N80.9 ENDOMETRIOSIS, UNSPECIFIED: ICD-10-CM

## 2022-03-18 ENCOUNTER — TRANSCRIPTION ENCOUNTER (OUTPATIENT)
Age: 32
End: 2022-03-18

## 2022-06-12 ENCOUNTER — NON-APPOINTMENT (OUTPATIENT)
Age: 32
End: 2022-06-12

## 2022-12-10 ENCOUNTER — EMERGENCY (EMERGENCY)
Facility: HOSPITAL | Age: 32
LOS: 0 days | Discharge: HOME | End: 2022-12-10
Attending: EMERGENCY MEDICINE | Admitting: EMERGENCY MEDICINE

## 2022-12-10 VITALS
TEMPERATURE: 98 F | RESPIRATION RATE: 18 BRPM | SYSTOLIC BLOOD PRESSURE: 113 MMHG | HEART RATE: 75 BPM | DIASTOLIC BLOOD PRESSURE: 69 MMHG | OXYGEN SATURATION: 98 %

## 2022-12-10 VITALS
HEIGHT: 65 IN | HEART RATE: 84 BPM | OXYGEN SATURATION: 96 % | WEIGHT: 126.1 LBS | RESPIRATION RATE: 18 BRPM | TEMPERATURE: 96 F | SYSTOLIC BLOOD PRESSURE: 107 MMHG | DIASTOLIC BLOOD PRESSURE: 62 MMHG

## 2022-12-10 DIAGNOSIS — R10.9 UNSPECIFIED ABDOMINAL PAIN: ICD-10-CM

## 2022-12-10 DIAGNOSIS — R11.10 VOMITING, UNSPECIFIED: ICD-10-CM

## 2022-12-10 DIAGNOSIS — N20.0 CALCULUS OF KIDNEY: ICD-10-CM

## 2022-12-10 DIAGNOSIS — Z86.2 PERSONAL HISTORY OF DISEASES OF THE BLOOD AND BLOOD-FORMING ORGANS AND CERTAIN DISORDERS INVOLVING THE IMMUNE MECHANISM: ICD-10-CM

## 2022-12-10 DIAGNOSIS — Z20.822 CONTACT WITH AND (SUSPECTED) EXPOSURE TO COVID-19: ICD-10-CM

## 2022-12-10 DIAGNOSIS — R42 DIZZINESS AND GIDDINESS: ICD-10-CM

## 2022-12-10 DIAGNOSIS — Z87.42 PERSONAL HISTORY OF OTHER DISEASES OF THE FEMALE GENITAL TRACT: ICD-10-CM

## 2022-12-10 LAB
ALBUMIN SERPL ELPH-MCNC: 4.2 G/DL — SIGNIFICANT CHANGE UP (ref 3.5–5.2)
ALP SERPL-CCNC: 51 U/L — SIGNIFICANT CHANGE UP (ref 30–115)
ALT FLD-CCNC: 8 U/L — SIGNIFICANT CHANGE UP (ref 0–41)
ANION GAP SERPL CALC-SCNC: 8 MMOL/L — SIGNIFICANT CHANGE UP (ref 7–14)
APPEARANCE UR: CLEAR — SIGNIFICANT CHANGE UP
AST SERPL-CCNC: 11 U/L — SIGNIFICANT CHANGE UP (ref 0–41)
BACTERIA # UR AUTO: ABNORMAL
BASOPHILS # BLD AUTO: 0.04 K/UL — SIGNIFICANT CHANGE UP (ref 0–0.2)
BASOPHILS NFR BLD AUTO: 0.5 % — SIGNIFICANT CHANGE UP (ref 0–1)
BILIRUB DIRECT SERPL-MCNC: <0.2 MG/DL — SIGNIFICANT CHANGE UP (ref 0–0.3)
BILIRUB INDIRECT FLD-MCNC: SIGNIFICANT CHANGE UP MG/DL (ref 0.2–1.2)
BILIRUB SERPL-MCNC: <0.2 MG/DL — SIGNIFICANT CHANGE UP (ref 0.2–1.2)
BILIRUB UR-MCNC: NEGATIVE — SIGNIFICANT CHANGE UP
BUN SERPL-MCNC: 9 MG/DL — LOW (ref 10–20)
CALCIUM SERPL-MCNC: 9 MG/DL — SIGNIFICANT CHANGE UP (ref 8.4–10.5)
CHLORIDE SERPL-SCNC: 102 MMOL/L — SIGNIFICANT CHANGE UP (ref 98–110)
CO2 SERPL-SCNC: 26 MMOL/L — SIGNIFICANT CHANGE UP (ref 17–32)
COLOR SPEC: YELLOW — SIGNIFICANT CHANGE UP
CREAT SERPL-MCNC: 0.7 MG/DL — SIGNIFICANT CHANGE UP (ref 0.7–1.5)
DIFF PNL FLD: ABNORMAL
EGFR: 119 ML/MIN/1.73M2 — SIGNIFICANT CHANGE UP
EOSINOPHIL # BLD AUTO: 0.07 K/UL — SIGNIFICANT CHANGE UP (ref 0–0.7)
EOSINOPHIL NFR BLD AUTO: 0.9 % — SIGNIFICANT CHANGE UP (ref 0–8)
EPI CELLS # UR: ABNORMAL /HPF
GLUCOSE SERPL-MCNC: 95 MG/DL — SIGNIFICANT CHANGE UP (ref 70–99)
GLUCOSE UR QL: NEGATIVE MG/DL — SIGNIFICANT CHANGE UP
HCG SERPL QL: NEGATIVE — SIGNIFICANT CHANGE UP
HCT VFR BLD CALC: 36.7 % — LOW (ref 37–47)
HGB BLD-MCNC: 12.2 G/DL — SIGNIFICANT CHANGE UP (ref 12–16)
IMM GRANULOCYTES NFR BLD AUTO: 0.3 % — SIGNIFICANT CHANGE UP (ref 0.1–0.3)
KETONES UR-MCNC: ABNORMAL
LACTATE SERPL-SCNC: 1.4 MMOL/L — SIGNIFICANT CHANGE UP (ref 0.7–2)
LEUKOCYTE ESTERASE UR-ACNC: NEGATIVE — SIGNIFICANT CHANGE UP
LIDOCAIN IGE QN: 25 U/L — SIGNIFICANT CHANGE UP (ref 7–60)
LYMPHOCYTES # BLD AUTO: 1.85 K/UL — SIGNIFICANT CHANGE UP (ref 1.2–3.4)
LYMPHOCYTES # BLD AUTO: 23.6 % — SIGNIFICANT CHANGE UP (ref 20.5–51.1)
MCHC RBC-ENTMCNC: 29.7 PG — SIGNIFICANT CHANGE UP (ref 27–31)
MCHC RBC-ENTMCNC: 33.2 G/DL — SIGNIFICANT CHANGE UP (ref 32–37)
MCV RBC AUTO: 89.3 FL — SIGNIFICANT CHANGE UP (ref 81–99)
MONOCYTES # BLD AUTO: 0.52 K/UL — SIGNIFICANT CHANGE UP (ref 0.1–0.6)
MONOCYTES NFR BLD AUTO: 6.6 % — SIGNIFICANT CHANGE UP (ref 1.7–9.3)
NEUTROPHILS # BLD AUTO: 5.35 K/UL — SIGNIFICANT CHANGE UP (ref 1.4–6.5)
NEUTROPHILS NFR BLD AUTO: 68.1 % — SIGNIFICANT CHANGE UP (ref 42.2–75.2)
NITRITE UR-MCNC: NEGATIVE — SIGNIFICANT CHANGE UP
NRBC # BLD: 0 /100 WBCS — SIGNIFICANT CHANGE UP (ref 0–0)
PH UR: 6 — SIGNIFICANT CHANGE UP (ref 5–8)
PLATELET # BLD AUTO: 316 K/UL — SIGNIFICANT CHANGE UP (ref 130–400)
POTASSIUM SERPL-MCNC: 4.2 MMOL/L — SIGNIFICANT CHANGE UP (ref 3.5–5)
POTASSIUM SERPL-SCNC: 4.2 MMOL/L — SIGNIFICANT CHANGE UP (ref 3.5–5)
PROT SERPL-MCNC: 6.3 G/DL — SIGNIFICANT CHANGE UP (ref 6–8)
PROT UR-MCNC: NEGATIVE MG/DL — SIGNIFICANT CHANGE UP
RBC # BLD: 4.11 M/UL — LOW (ref 4.2–5.4)
RBC # FLD: 12.2 % — SIGNIFICANT CHANGE UP (ref 11.5–14.5)
RBC CASTS # UR COMP ASSIST: ABNORMAL /HPF
SARS-COV-2 RNA SPEC QL NAA+PROBE: SIGNIFICANT CHANGE UP
SODIUM SERPL-SCNC: 136 MMOL/L — SIGNIFICANT CHANGE UP (ref 135–146)
SP GR SPEC: 1.01 — SIGNIFICANT CHANGE UP (ref 1.01–1.03)
UROBILINOGEN FLD QL: 0.2 MG/DL — SIGNIFICANT CHANGE UP
WBC # BLD: 7.85 K/UL — SIGNIFICANT CHANGE UP (ref 4.8–10.8)
WBC # FLD AUTO: 7.85 K/UL — SIGNIFICANT CHANGE UP (ref 4.8–10.8)
WBC UR QL: NEGATIVE — SIGNIFICANT CHANGE UP

## 2022-12-10 PROCEDURE — G1004: CPT

## 2022-12-10 PROCEDURE — 99285 EMERGENCY DEPT VISIT HI MDM: CPT

## 2022-12-10 PROCEDURE — 76830 TRANSVAGINAL US NON-OB: CPT | Mod: 26

## 2022-12-10 PROCEDURE — 74177 CT ABD & PELVIS W/CONTRAST: CPT | Mod: 26,MG

## 2022-12-10 RX ORDER — KETOROLAC TROMETHAMINE 30 MG/ML
30 SYRINGE (ML) INJECTION ONCE
Refills: 0 | Status: DISCONTINUED | OUTPATIENT
Start: 2022-12-10 | End: 2022-12-10

## 2022-12-10 RX ORDER — SODIUM CHLORIDE 9 MG/ML
1000 INJECTION INTRAMUSCULAR; INTRAVENOUS; SUBCUTANEOUS ONCE
Refills: 0 | Status: COMPLETED | OUTPATIENT
Start: 2022-12-10 | End: 2022-12-10

## 2022-12-10 RX ADMIN — Medication 30 MILLIGRAM(S): at 18:41

## 2022-12-10 RX ADMIN — SODIUM CHLORIDE 1000 MILLILITER(S): 9 INJECTION INTRAMUSCULAR; INTRAVENOUS; SUBCUTANEOUS at 18:41

## 2022-12-10 RX ADMIN — SODIUM CHLORIDE 1000 MILLILITER(S): 9 INJECTION INTRAMUSCULAR; INTRAVENOUS; SUBCUTANEOUS at 13:56

## 2022-12-10 RX ADMIN — Medication 30 MILLIGRAM(S): at 13:56

## 2022-12-10 NOTE — ED PROVIDER NOTE - PROGRESS NOTE DETAILS
Patient feels better since getting Toradol has not had any breakthrough pain, made aware of blood in urine will follow-up with urology.  Given strict return precautions regarding shingles-like rash and potential call from us regarding his urine culture result.  Patient agreeable to plan will DC

## 2022-12-10 NOTE — ED PROVIDER NOTE - PHYSICAL EXAMINATION
Physical Exam    Vital Signs: I have reviewed the initial vital signs.  Constitutional: appears stated age, no acute distress, appears active and comfortable on stretcher  Eyes: Conjunctiva pink, Sclera clear  Cardiovascular: S1 and S2, regular rate, regular rhythm, well-perfused extremities, radial pulses equal and 2+, pedal pulses 2+ and equal  Respiratory: unlabored respiratory effort, clear to auscultation bilaterally no wheezing, rales and rhonchi  Gastrointestinal: soft, mild suprapubic tenderness to palpation  Musculoskeletal: supple neck, no lower extremity edema  Neurologic: awake, alert, extremities’ motor and sensory functions grossly intact  Psychiatric: appropriate mood, appropriate affect

## 2022-12-10 NOTE — ED ADULT NURSE NOTE - NSIMPLEMENTINTERV_GEN_ALL_ED
Implemented All Universal Safety Interventions:  [place] to call system. Call bell, personal items and telephone within reach. Instruct patient to call for assistance. Room bathroom lighting operational. Non-slip footwear when patient is off stretcher. Physically safe environment: no spills, clutter or unnecessary equipment. Stretcher in lowest position, wheels locked, appropriate side rails in place.

## 2022-12-10 NOTE — ED PROVIDER NOTE - ATTENDING APP SHARED VISIT CONTRIBUTION OF CARE
32-year-old female with past medical history of endometriosis presents for evaluation of left-sided flank pain that radiated to left lower abdomen since waking up this morning.  Patient states she had 1 episode of vomiting.  Patient was seen bedside and fell and the pain did improve.  No diarrhea, no fever or chills, no urinary complaints, no vaginal discharge, on exam patient in NAD, AAOx3, appears well, abdomen is soft, nondistended, positive mild tenderness to left lower quadrant, no rebound or guarding

## 2022-12-10 NOTE — ED PROVIDER NOTE - NS ED ROS FT
Constitutional: (-) fever, (-) chills  Eyes: (-) visual changes  ENT: (-) nasal congestion  Cardiovascular: (-) chest pain, (-) syncope  Respiratory: (-) cough, (-) shortness of breath, (-) dyspnea,   Gastrointestinal: (+) vomiting, (-) diarrhea, (-)nausea (+) L flank pain  Musculoskeletal: (-) neck pain, (-) back pain, (-) joint pain,  Integumentary: (-) rash, (-) edema, (-) bruises  Neurological: (-) headache, (-) loc, (-) dizziness  Peripheral Vascular: (-) leg swelling  :  (-)dysuria,  (-) hematuria  Allergic/Immunologic: (-) pruritus

## 2022-12-10 NOTE — ED PROVIDER NOTE - PATIENT PORTAL LINK FT
You can access the FollowMyHealth Patient Portal offered by [facility] by registering at the following website: [email or web address]. By joining [employer]’s FollowMyHealth portal, you will also be able to view your health information using other applications (apps) compatible with our system.

## 2022-12-10 NOTE — ED PROVIDER NOTE - CARE PROVIDER_API CALL
Maria Isabel Luna)  Urology  [address] [place] [address], [address]  [place] [address]  Phone: [phone]  Fax: [phone]  Follow Up Time:     Juan Martinez ()  Infectious Disease; Internal Medicine  [address] [place] [address]  Phone: [phone]  Fax: [phone]  Follow Up Time:

## 2022-12-10 NOTE — ED PROVIDER NOTE - NS ED ATTENDING STATEMENT MOD
This was a shared visit with the MARISEL. I reviewed and verified the documentation and independently performed the documented:

## 2022-12-10 NOTE — ED PROVIDER NOTE - OBJECTIVE STATEMENT
32-year-old female with PMH endometriosis presents with complaint of left flank pain that began on waking this a.m.  Patient states that the pain was throbbing when she woke up, but on changing positions she felt a 10/10 shooting pain in the left flank that lasted for 5 minutes and caused her to become dizzy +1 episode nonbilious nonbloody emesis.  Patient states she had a heating pad on and took Advil and the pain was 5/10 at 30 min prior to arrival at ED.  Patient states she had a similar episode in the past, which was a result of an ovarian cyst.  She is tolerating p.o.  States she is not sure of her last menstrual period, has been spotting all month, states she had her period sometime this past month. Denies fevers/chills, SOB, abdominal pain, nausea, dysuria, hematuria.

## 2022-12-10 NOTE — ED PROVIDER NOTE - CLINICAL SUMMARY MEDICAL DECISION MAKING FREE TEXT BOX
Patient's pain is treated.  Labs and imaging obtained.  Patient found with right-sided lower pole calculus.  Patient also with microscopic hematuria.  Patient will need to follow-up with urology as well as her GYN as outpatient.  Patient to return if any worsening symptoms or concerns.  Patient verbalizes understanding.

## 2022-12-10 NOTE — ED ADULT TRIAGE NOTE - CHIEF COMPLAINT QUOTE
left sided lower flank pain, intermittent, throbbing pain, front to back, 10/10, unrelieved with Advil and heat pad. Hx of ovarian cyst. and endometriosis.

## 2022-12-12 LAB
CULTURE RESULTS: SIGNIFICANT CHANGE UP
SPECIMEN SOURCE: SIGNIFICANT CHANGE UP

## 2023-01-11 ENCOUNTER — EMERGENCY (EMERGENCY)
Facility: HOSPITAL | Age: 33
LOS: 0 days | Discharge: HOME | End: 2023-01-11
Attending: EMERGENCY MEDICINE | Admitting: EMERGENCY MEDICINE
Payer: MEDICAID

## 2023-01-11 VITALS
TEMPERATURE: 96 F | RESPIRATION RATE: 18 BRPM | OXYGEN SATURATION: 100 % | WEIGHT: 123.9 LBS | HEART RATE: 84 BPM | HEIGHT: 65 IN | DIASTOLIC BLOOD PRESSURE: 63 MMHG | SYSTOLIC BLOOD PRESSURE: 141 MMHG

## 2023-01-11 VITALS — SYSTOLIC BLOOD PRESSURE: 111 MMHG | HEART RATE: 94 BPM | DIASTOLIC BLOOD PRESSURE: 59 MMHG

## 2023-01-11 DIAGNOSIS — Z86.2 PERSONAL HISTORY OF DISEASES OF THE BLOOD AND BLOOD-FORMING ORGANS AND CERTAIN DISORDERS INVOLVING THE IMMUNE MECHANISM: ICD-10-CM

## 2023-01-11 DIAGNOSIS — M54.9 DORSALGIA, UNSPECIFIED: ICD-10-CM

## 2023-01-11 DIAGNOSIS — N20.0 CALCULUS OF KIDNEY: ICD-10-CM

## 2023-01-11 PROBLEM — N80.9 ENDOMETRIOSIS, UNSPECIFIED: Chronic | Status: ACTIVE | Noted: 2022-12-10

## 2023-01-11 LAB
ALBUMIN SERPL ELPH-MCNC: 4.1 G/DL — SIGNIFICANT CHANGE UP (ref 3.5–5.2)
ALP SERPL-CCNC: 50 U/L — SIGNIFICANT CHANGE UP (ref 30–115)
ALT FLD-CCNC: 8 U/L — SIGNIFICANT CHANGE UP (ref 0–41)
ANION GAP SERPL CALC-SCNC: 10 MMOL/L — SIGNIFICANT CHANGE UP (ref 7–14)
APPEARANCE UR: CLEAR — SIGNIFICANT CHANGE UP
APTT BLD: 27.3 SEC — SIGNIFICANT CHANGE UP (ref 27–39.2)
AST SERPL-CCNC: 12 U/L — SIGNIFICANT CHANGE UP (ref 0–41)
BACTERIA # UR AUTO: ABNORMAL
BASOPHILS # BLD AUTO: 0.06 K/UL — SIGNIFICANT CHANGE UP (ref 0–0.2)
BASOPHILS NFR BLD AUTO: 0.6 % — SIGNIFICANT CHANGE UP (ref 0–1)
BILIRUB SERPL-MCNC: 0.2 MG/DL — SIGNIFICANT CHANGE UP (ref 0.2–1.2)
BILIRUB UR-MCNC: NEGATIVE — SIGNIFICANT CHANGE UP
BUN SERPL-MCNC: 10 MG/DL — SIGNIFICANT CHANGE UP (ref 10–20)
CALCIUM SERPL-MCNC: 9.1 MG/DL — SIGNIFICANT CHANGE UP (ref 8.4–10.5)
CHLORIDE SERPL-SCNC: 104 MMOL/L — SIGNIFICANT CHANGE UP (ref 98–110)
CO2 SERPL-SCNC: 26 MMOL/L — SIGNIFICANT CHANGE UP (ref 17–32)
COLOR SPEC: YELLOW — SIGNIFICANT CHANGE UP
CREAT SERPL-MCNC: 0.8 MG/DL — SIGNIFICANT CHANGE UP (ref 0.7–1.5)
DIFF PNL FLD: ABNORMAL
EGFR: 100 ML/MIN/1.73M2 — SIGNIFICANT CHANGE UP
EOSINOPHIL # BLD AUTO: 0.03 K/UL — SIGNIFICANT CHANGE UP (ref 0–0.7)
EOSINOPHIL NFR BLD AUTO: 0.3 % — SIGNIFICANT CHANGE UP (ref 0–8)
EPI CELLS # UR: ABNORMAL /HPF
GLUCOSE SERPL-MCNC: 98 MG/DL — SIGNIFICANT CHANGE UP (ref 70–99)
GLUCOSE UR QL: NEGATIVE MG/DL — SIGNIFICANT CHANGE UP
HCG SERPL QL: NEGATIVE — SIGNIFICANT CHANGE UP
HCT VFR BLD CALC: 35.2 % — LOW (ref 37–47)
HGB BLD-MCNC: 11.7 G/DL — LOW (ref 12–16)
IMM GRANULOCYTES NFR BLD AUTO: 0.3 % — SIGNIFICANT CHANGE UP (ref 0.1–0.3)
INR BLD: 1.03 RATIO — SIGNIFICANT CHANGE UP (ref 0.65–1.3)
KETONES UR-MCNC: 15
LEUKOCYTE ESTERASE UR-ACNC: ABNORMAL
LYMPHOCYTES # BLD AUTO: 1.64 K/UL — SIGNIFICANT CHANGE UP (ref 1.2–3.4)
LYMPHOCYTES # BLD AUTO: 15.5 % — LOW (ref 20.5–51.1)
MCHC RBC-ENTMCNC: 29.5 PG — SIGNIFICANT CHANGE UP (ref 27–31)
MCHC RBC-ENTMCNC: 33.2 G/DL — SIGNIFICANT CHANGE UP (ref 32–37)
MCV RBC AUTO: 88.7 FL — SIGNIFICANT CHANGE UP (ref 81–99)
MONOCYTES # BLD AUTO: 0.45 K/UL — SIGNIFICANT CHANGE UP (ref 0.1–0.6)
MONOCYTES NFR BLD AUTO: 4.3 % — SIGNIFICANT CHANGE UP (ref 1.7–9.3)
NEUTROPHILS # BLD AUTO: 8.37 K/UL — HIGH (ref 1.4–6.5)
NEUTROPHILS NFR BLD AUTO: 79 % — HIGH (ref 42.2–75.2)
NITRITE UR-MCNC: NEGATIVE — SIGNIFICANT CHANGE UP
NRBC # BLD: 0 /100 WBCS — SIGNIFICANT CHANGE UP (ref 0–0)
PH UR: 6 — SIGNIFICANT CHANGE UP (ref 5–8)
PLATELET # BLD AUTO: 332 K/UL — SIGNIFICANT CHANGE UP (ref 130–400)
POTASSIUM SERPL-MCNC: 4.1 MMOL/L — SIGNIFICANT CHANGE UP (ref 3.5–5)
POTASSIUM SERPL-SCNC: 4.1 MMOL/L — SIGNIFICANT CHANGE UP (ref 3.5–5)
PROT SERPL-MCNC: 6.7 G/DL — SIGNIFICANT CHANGE UP (ref 6–8)
PROT UR-MCNC: NEGATIVE MG/DL — SIGNIFICANT CHANGE UP
PROTHROM AB SERPL-ACNC: 11.8 SEC — SIGNIFICANT CHANGE UP (ref 9.95–12.87)
RBC # BLD: 3.97 M/UL — LOW (ref 4.2–5.4)
RBC # FLD: 12.2 % — SIGNIFICANT CHANGE UP (ref 11.5–14.5)
RBC CASTS # UR COMP ASSIST: ABNORMAL /HPF
SARS-COV-2 RNA SPEC QL NAA+PROBE: SIGNIFICANT CHANGE UP
SODIUM SERPL-SCNC: 140 MMOL/L — SIGNIFICANT CHANGE UP (ref 135–146)
SP GR SPEC: 1.01 — SIGNIFICANT CHANGE UP (ref 1.01–1.03)
UROBILINOGEN FLD QL: 0.2 MG/DL — SIGNIFICANT CHANGE UP
WBC # BLD: 10.58 K/UL — SIGNIFICANT CHANGE UP (ref 4.8–10.8)
WBC # FLD AUTO: 10.58 K/UL — SIGNIFICANT CHANGE UP (ref 4.8–10.8)
WBC UR QL: SIGNIFICANT CHANGE UP /HPF

## 2023-01-11 PROCEDURE — 99285 EMERGENCY DEPT VISIT HI MDM: CPT

## 2023-01-11 PROCEDURE — 74177 CT ABD & PELVIS W/CONTRAST: CPT | Mod: 26,MA

## 2023-01-11 RX ORDER — ONDANSETRON 8 MG/1
1 TABLET, FILM COATED ORAL
Qty: 9 | Refills: 0
Start: 2023-01-11 | End: 2023-01-13

## 2023-01-11 RX ORDER — ONDANSETRON 8 MG/1
4 TABLET, FILM COATED ORAL ONCE
Refills: 0 | Status: COMPLETED | OUTPATIENT
Start: 2023-01-11 | End: 2023-01-11

## 2023-01-11 RX ORDER — KETOROLAC TROMETHAMINE 30 MG/ML
1 SYRINGE (ML) INJECTION
Qty: 20 | Refills: 0
Start: 2023-01-11 | End: 2023-01-15

## 2023-01-11 RX ORDER — MORPHINE SULFATE 50 MG/1
4 CAPSULE, EXTENDED RELEASE ORAL ONCE
Refills: 0 | Status: DISCONTINUED | OUTPATIENT
Start: 2023-01-11 | End: 2023-01-11

## 2023-01-11 RX ORDER — SODIUM CHLORIDE 9 MG/ML
1000 INJECTION INTRAMUSCULAR; INTRAVENOUS; SUBCUTANEOUS ONCE
Refills: 0 | Status: COMPLETED | OUTPATIENT
Start: 2023-01-11 | End: 2023-01-11

## 2023-01-11 RX ORDER — TAMSULOSIN HYDROCHLORIDE 0.4 MG/1
1 CAPSULE ORAL
Qty: 5 | Refills: 0
Start: 2023-01-11 | End: 2023-01-15

## 2023-01-11 RX ADMIN — MORPHINE SULFATE 4 MILLIGRAM(S): 50 CAPSULE, EXTENDED RELEASE ORAL at 12:04

## 2023-01-11 RX ADMIN — SODIUM CHLORIDE 1000 MILLILITER(S): 9 INJECTION INTRAMUSCULAR; INTRAVENOUS; SUBCUTANEOUS at 12:03

## 2023-01-11 RX ADMIN — SODIUM CHLORIDE 1000 MILLILITER(S): 9 INJECTION INTRAMUSCULAR; INTRAVENOUS; SUBCUTANEOUS at 13:15

## 2023-01-11 RX ADMIN — ONDANSETRON 4 MILLIGRAM(S): 8 TABLET, FILM COATED ORAL at 13:04

## 2023-01-11 RX ADMIN — MORPHINE SULFATE 4 MILLIGRAM(S): 50 CAPSULE, EXTENDED RELEASE ORAL at 12:12

## 2023-01-11 RX ADMIN — MORPHINE SULFATE 4 MILLIGRAM(S): 50 CAPSULE, EXTENDED RELEASE ORAL at 13:02

## 2023-01-11 NOTE — ED PROVIDER NOTE - OBJECTIVE STATEMENT
33-year-old female presents to the ED for evaluation of back pain.  Patient states she is having right-sided pain today.  Patient admits she was seen in the ED 2 weeks ago for a different complaint and at that time CT showed that she had a kidney stone in the right kidney

## 2023-01-11 NOTE — ED ADULT NURSE NOTE - NSIMPLEMENTINTERV_GEN_ALL_ED
Implemented All Universal Safety Interventions:  [place] to call system. Call bell, personal items and telephone within reach. Instruct patient to call for assistance. Room bathroom lighting operational. Non-slip footwear when patient is off stretcher. Physically safe environment: no spills, clutter or unnecessary equipment. Stretcher in lowest position, wheels locked, appropriate side rails in place.

## 2023-01-11 NOTE — ED PROVIDER NOTE - PATIENT PORTAL LINK FT
You can access the FollowMyHealth Patient Portal offered by [facility] by registering at the following website: [email or web address]. By joining [employer]’s FollowMyHealth portal, you will also be able to view your health information using other applications (apps) compatible with our system.

## 2023-01-11 NOTE — ED PROVIDER NOTE - NS ED ROS FT
Review of Systems:  	•	CONSTITUTIONAL - no fever, no diaphoresis, no chills    	•	GI - flank pain , no nausea, no vomiting, no diarrhea, no constipation  	•	GENITO-URINARY - no discharge, no dysuria; no hematuria, no increased urinary frequency

## 2023-01-11 NOTE — ED PROVIDER NOTE - CLINICAL SUMMARY MEDICAL DECISION MAKING FREE TEXT BOX
patient presents for evaluation of right flank pain most consistent with kidney stone she was given IV fluids IV pain medicine I reviewed prior ER records she improved here in the emergency department he obtained CT scan which demonstrates kidney stone patient was observed here in the emergency department pain is tolerable she is tolerating p.o. afebrile no signs of infection in her urine at this time stable for discharge advised to follow-up with urology in the next 24 to 48 hours or return to the emergency department for further evaluation

## 2023-01-11 NOTE — ED PROVIDER NOTE - ATTENDING APP SHARED VISIT CONTRIBUTION OF CARE
I was present for and supervised the key and critical aspects of the procedures performed during the care of the patient. Patient is a 33-year-old female presents for evaluation of back pain located on the right side moderate throbbing in nature no fevers no chills no vomiting no diaphoresis patient was doing well until sudden onset of pain she notes that she was seen 2 weeks in the emergency department diagnosed with a renal stone denies any dysuria hematuria hesitancy frequency    On physical exam patient is normocephalic atraumatic pupils equal round react light accommodation extraocular muscle intact oropharynx clear chest clear to auscultation bilaterally abdomen soft nontender nondistended Metharose positive no guarding rebound patient is right-sided CVA tenderness no edema noted she is able ambulate well    Assessment plan patient presents for evaluation of right flank pain most consistent with kidney stone she was given IV fluids IV pain medicine I reviewed prior ER records she improved here in the emergency department he obtained CT scan which demonstrates kidney stone patient was observed here in the emergency department pain is tolerable she is tolerating p.o. afebrile no signs of infection in her urine at this time stable for discharge advised to follow-up with urology in the next 24 to 48 hours or return to the emergency department for further evaluation

## 2023-01-11 NOTE — ED ADULT NURSE NOTE - CAS EDN DISCHARGE INTERVENTIONS
Detail Level: Detailed Quality 110: Preventive Care And Screening: Influenza Immunization: Influenza immunization was not ordered or administered, reason not given IV discontinued, cath removed intact

## 2023-01-11 NOTE — ED PROVIDER NOTE - PHYSICAL EXAMINATION
--EXAM--  VITAL SIGNS: I have reviewed vs documented at present.  CONSTITUTIONAL: Well-developed; well-nourished; in no acute distress.     CARD: S1, S2, Regular rate and rhythm.   RESP: No wheezes, rales or rhonchi.  ABD: Normal bowel sounds; soft; non-distended; non-tender.  EXT: Normal ROM.   NEURO: Alert, oriented, grossly unremarkable. Strength 5/5 in all extremities. Sensation intact throughout.  PSYCH: Cooperative, appropriate.

## 2023-01-14 LAB
CULTURE RESULTS: SIGNIFICANT CHANGE UP
SPECIMEN SOURCE: SIGNIFICANT CHANGE UP

## 2023-02-27 ENCOUNTER — NON-APPOINTMENT (OUTPATIENT)
Age: 33
End: 2023-02-27

## 2023-03-09 ENCOUNTER — NON-APPOINTMENT (OUTPATIENT)
Age: 33
End: 2023-03-09

## 2023-04-07 NOTE — ED ADULT NURSE NOTE - DRUG PRE-SCREENING (DAST -1)
[Alert] : alert [Normocephalic] : normocephalic [Flat Open Anterior [place]] : flat open anterior fontanelle [Red Reflex] : red reflex bilateral [PERRL] : PERRL [Normally Placed Ears] : normally placed ears [Auricles Well Formed] : auricles well formed [Clear Tympanic membranes] : clear tympanic membranes [Light reflex present] : light reflex present Statement Selected [Bony landmarks visible] : bony landmarks visible [Nares Patent] : nares patent [Palate Intact] : palate intact [Uvula Midline] : uvula midline [Supple, full passive range of motion] : supple, full passive range of motion [Symmetric Chest Rise] : symmetric chest rise [Clear to Auscultation Bilaterally] : clear to auscultation bilaterally [Regular Rate and Rhythm] : regular rate and rhythm [S1, S2 present] : S1, S2 present [+2 Femoral Pulses] : (+) 2 femoral pulses [Soft] : soft [Bowel Sounds] : bowel sounds present [Normal External Genitalia] : normal external genitalia [Normal Vaginal Introitus] : normal vaginal introitus [Patent] : patent [Normally Placed] : normally placed [No Abnormal Lymph Nodes Palpated] : no abnormal lymph nodes palpated [Symmetric Buttocks Creases] : symmetric buttocks creases [Plantar Grasp] : plantar grasp reflex present [Cranial Nerves Grossly Intact] : cranial nerves grossly intact [Acute Distress] : no acute distress [Discharge] : no discharge [Tooth Eruption] : no tooth eruption [Palpable Masses] : no palpable masses [Murmurs] : no murmurs [Tender] : nontender [Distended] : nondistended [Hepatomegaly] : no hepatomegaly [Splenomegaly] : no splenomegaly [Clitoromegaly] : no clitoromegaly [Gallardo-Ortolani] : negative Gallardo-Ortolani [Allis Sign] : negative Allis sign [Spinal Dimple] : no spinal dimple [Tuft of Hair] : no tuft of hair [Rash or Lesions] : no rash/lesions

## 2023-09-12 NOTE — PRE-ANESTHESIA EVALUATION ADULT - NSANTHTOBACCOSD_GEN_ALL_CORE
intermittant and Related to dog allergy. Pt will be changing her routine so she is not going to her daughter's house to babysit due to the dogs. She will use claratin and flonase for sx as well as mucinex to loosen phelgm. She was provided with an inhaler to try when she is wheezing. Pt will rto if sx persist.   
No

## 2023-09-26 NOTE — H&P PST ADULT - NS NEC GEN PE MLT EXAM PC
Psychiatric Evaluation - Behavioral Health   Analisa Ro 29 y.o. female MRN: [id number]  Unit/Bed#: [facility] 207-02 Encounter: [id number]    Assessment/Plan   Principal Problem:    Mood disorder ([address])      Plan:   Start Depakote ER 500mg PO QD with plan to draw level at steady state. Admit to [facility] psychiatry. Medical management per SLIM. Check admission labs: CMP, CBC, TSH, RPR, UDS, Lipid Panel, A1c. Collaborate with case management for baseline assessment and disposition planning.   Chart reviewed and case discussed with treatment team.  Start/continue the following medications:  Current Facility-Administered Medications   Medication Dose Route Frequency Provider Last Rate   • acetaminophen  650 mg Oral Q6H PRN Sruthi East MD     • acetaminophen  650 mg Oral Q4H PRN Sruthi East MD     • acetaminophen  975 mg Oral Q6H PRN Sruthi East MD     • aluminum-magnesium hydroxide-simethicone  30 mL Oral Q4H PRN Sruthi East MD     • haloperidol lactate  2.5 mg Intramuscular Q4H PRN Max 4/day Sruthi East MD      And   • LORazepam  1 mg Intramuscular Q4H PRN Max 4/day Sruthi East MD      And   • benztropine  0.5 mg Intramuscular Q4H PRN Max 4/day Sruthi East MD     • haloperidol lactate  5 mg Intramuscular Q4H PRN Max 4/day Sruthi East MD      And   • LORazepam  2 mg Intramuscular Q4H PRN Max 4/day Sruthi East MD      And   • benztropine  1 mg Intramuscular Q4H PRN Max 4/day Sruthi East MD     • benztropine  1 mg Oral Q4H PRN Max 6/day Sruthi East MD     • bisacodyl  10 mg Rectal Daily PRN Sruthi East MD     • hydrOXYzine HCL  50 mg Oral Q6H PRN Max 4/day Sruthi East MD      Or   • diphenhydrAMINE  50 mg Intramuscular Q6H PRN Sruthi East MD     • divalproex sodium  500 mg Oral Daily Tamara Lindquist DO     • haloperidol  1 mg Oral Q6H PRN Sruthi East MD     • haloperidol  2.5 mg Oral Q4H PRN Max 4/day Baron Bryan MD     • haloperidol  5 mg Oral Q4H PRN Max 4/day Baron Bryan MD     • hydrOXYzine HCL  100 mg Oral Q6H PRN Max 4/day Baron Bryan MD      Or   • LORazepam  2 mg Intramuscular Q6H PRN Baron Bryan MD     • hydrOXYzine HCL  25 mg Oral Q6H PRN Max 4/day Baron Bryan MD     • polyethylene glycol  17 g Oral Daily PRN Baron Bryan MD     • senna-docusate sodium  1 tablet Oral Daily PRN Baron Bryan MD     • traZODone  50 mg Oral HS PRN Baron Bryan MD         Treatment options and alternatives were reviewed with the patient, who concurs with the above plan. Risks, benefits, and possible side effects of medications were explained to the patient, and she verbalizes understanding.      -----------------------------------    Chief Complaint: Depression    History of Present Illness     Per Crisis worker note:  Met with patient, parents at bedside, to complete the crisis intake assessment and safety risk assessment. Patient's parents brought her to the ED with c/o not sleeping and depression. Patient was sitting up in bed with head bent down. She was wiping tears and did not respond to greeting from CIS or questions asked. Patient's mother answered most questions. Mother reported that the patient goes back and forth to [place]. She stated that she works for brief periods of time to get the money to go there and comes home when out of money. This last trip, the patient lost her pass port and her father had to get her a new one and go to [place] to get her. Mother reported that the patient was in a psychiatric facility while there. She is no longer taking the medication. Patient was brought home and lost her second pass port as well as her phone and her mother's phone. Patient is now presenting as depressed. Mother reported that the patient is not sleeping. She is isolating herself to her room and reads her Bible all day. Patient is making no social contacts. Mother stated that the patient becomes aggressive with her. She reportedly yells and pushes her mother. Father reported that the patient only eats if they tell her to. She does not seek food on her own. Mother reported that she has had periods of depression like this since 2. Mother describes cyclic pattern of working to get money to go to [place], coming back and getting depressed until she decides to work again to get money to go to [place]. Mother talked about counseling but father thought she needed inpatient. Patient was not agreeable to inpatient. Mother was advised that the patient was not likely to participate in counseling in her current condition. Patient was asked if she would be willing to sign a 201. She was not. Father was agreeable to filing a 302. Patient was advised of the 302 and the benefits of signing a 201. Patient began screaming at her parents and the CIS. She stated, "the devil is in you!, You're evil! ."  She addressed parents and CIS with statement. Patient tried to leave the room. She stated, "[employer] me! I want to die!"  Patient attempted to leave multiple times. CIS attempted to explain the 302 process but mother did not want the 302 to be on patient's record. A 2-doctor 302 was put in place and the parents were advised that we would continue to ask her to sign a 201. Patient was advised of her rights. It is not know if the patient understood. On admission to Inpatient Psychiatric Unit:  Patient is a 29-year-old black female with an unclear past psychiatric history, likely mood disorder unspecified, who presents voluntarily to inpatient U with depression, decreased sleep, and decreased appetite. Symptoms prior to hospitalization include: Depressed mood, decreased sleep, decreased appetite, Episcopal preoccupation. Exacerbating stressors are recently moving back home from [place]. Mitigating factors are family support.   Timeline is progressively worsening over the course of the past several weeks. Symptom severity is rated as severe. On initial psychiatric evaluation today, the patient is a very poor historian. She is scant in conversation and her thoughts are disorganized. She is attempting to explain to me why she is here and states "there was no other option."  She is religiously preoccupied and states that she moved to [place] to engage in a spiritual trip and that she traveled all over [place] praying at various churches. She was able to tell me that she was hospitalized psychiatrically in [facility] and started on antipsychotic medication but is unable to state which medication she was started on, and notes that she had side effects including shaking. The patient has some racing thoughts and increased rate of her speech. She states that she has never been diagnosed with anything psychiatrically in the past despite being hospitalized. She is not currently feeling suicidal and she actually denies symptoms of depression. Psychiatric Review Of Systems:  Medication side effects: none  Sleep: Decreased   appetite: Decreased  Hygiene: able to tend to instrumental and basic ADLs  Anxiety Symptoms: denies  Psychotic Symptoms: denies  Depression Symptoms: Per HPI  Manic Symptoms: Per HPI  PTSD Symptoms: denies  Suicidal Thoughts: denies  Homicidal Thoughts: denies    Medical Review Of Systems:   Patient denies headache or dizziness. Patient denies chest pain or palpitations. Patient denies difficulty breathing or wheezing. Patient denies nausea, vomiting, or diarrhea. Patient denies polyuria or polydipsia. Patient denies weakness or numbness. Pertinent positives as per HPI.     Historical Information     Psychiatric History:   Diagnoses: Unknown  Inpatient Hx: In [place], also was at [place] in 2022  Outpatient Hx: None  Medications/Trials: Unknown    Substance Abuse History:  Social History     Substance and Sexual Activity   Alcohol Use Not Currently Comment: Denies     Social History     Substance and Sexual Activity   Drug Use Never       I spent time with Rasheed Moore in counseling and education on risk of substance abuse. I assessed motivation and encouraged her for treatment as appropriate. Family History:   History reviewed. No pertinent family history. Social History:  Highest education: Masters  Currently living: With parents  Relationships: Single  Children: None  Occupation: Unemployed  No legal hx  No  hx    Rest of social history as per below:    Social History     Socioeconomic History   • Marital status: Single     Spouse name: Not on file   • Number of children: Not on file   • Years of education: Not on file   • Highest education level: Not on file   Occupational History   • Not on file   Tobacco Use   • Smoking status: Never     Passive exposure: Never   • Smokeless tobacco: Never   • Tobacco comments:     N/A, patient is a non-smoker.    Vaping Use   • Vaping Use: Never used   Substance and Sexual Activity   • Alcohol use: Not Currently     Comment: Denies   • Drug use: Never   • Sexual activity: Not Currently   Other Topics Concern   • Not on file   Social History Narrative   • Not on file     Social Determinants of Health     Financial Resource Strain: Not on file   Food Insecurity: Not on file   Transportation Needs: Not on file   Physical Activity: Not on file   Stress: Not on file   Social Connections: Not on file   Intimate Partner Violence: Not on file   Housing Stability: Not on file       Past Medical History:   Past Medical History:   Diagnosis Date   • Anxiety    • Depression    • Fibroids 07/25/2022   • Iron deficiency anemia due to chronic blood loss 07/25/2022   • Sleep difficulties         -----------------------------------  Objective    Temp:  [97.6 °F (36.4 °C)] 97.6 °F (36.4 °C)  HR:  [69] 69  Resp:  [14] 14  BP: (106)/(79) 106/79    Mental Status Evaluation:  Appearance: disheveled, appears consistent with stated age  Motor: +psychomotor agitation, no gait abnormalities  Behavior: superficially cooperative, requires repeated verbal redirection  Speech: pressured, volume is intermittently loud  Mood: "There was no other option"  Affect: expansive, labile, manic-appearing  Thought Process: disorganized, tangential, grandiose  Thought Content: denies auditory hallucinations, denies visual hallucinations, denies delusions  Risk Potential: denies suicidal ideation, plan, or intent. Denies homicidal ideation  Sensorium: Oriented to person, place, time, and situation  Cognition: cognitive ability appears intact but was not quantitatively tested  Consciousness: alert and awake  Attention: currently impaired  Insight: limited  Judgement: limited      Meds/Allergies   Allergies   Allergen Reactions   • Penicillins Other (See Comments)     Reaction Date: 21Jul2008; Reaction Date: 21Jul2008;      • Augmentin [Amoxicillin-Pot Clavulanate] Rash         Behavioral Health Medications: all current active meds have been reviewed as per above. Changes as per above. Risks, benefits, indications, and alternatives to treatment were discussed with patient. Laboratory results:  I have personally reviewed all pertinent laboratory/tests results.   Recent Results (from the past 48 hour(s))   POCT alcohol breath test    Collection Time: 09/24/23 10:53 AM   Result Value Ref Range    EXTBreath Alcohol 0.000    Rapid drug screen, urine    Collection Time: 09/24/23  7:06 PM   Result Value Ref Range    Amph/Meth UR Negative Negative    Barbiturate Ur Negative Negative    Benzodiazepine Urine Negative Negative    Cocaine Urine Negative Negative    Methadone Urine      Opiate Urine Negative Negative    PCP Ur Negative Negative    THC Urine Negative Negative    Oxycodone Urine Negative Negative   POCT pregnancy, urine    Collection Time: 09/24/23  7:06 PM   Result Value Ref Range    EXT Preg Test, Ur Negative     Control          Progress Toward Goals & Illness Status: Patient is not at goal. They are psychiatrically unstable and are not yet ready for discharge. The patient's condition currently requires active psychopharmacological medication management, interdisciplinary coordination with case management, and the utilization of adjunctive milieu and group therapy to augment psychopharmacological efficacy. The patient's risk of morbidity, and progression or decompensation of psychiatric disease, is high without this current treatment.           -----------------------------------    Risks / Benefits of Treatment:     Risks, benefits, and possible side effects of medications explained to patient. The patient verbalizes understanding and agreement for treatment. Counseling / Coordination of Care:     Patient's presentation on admission and proposed treatment plan were discussed with the treatment team.  Diagnosis, medication changes and treatment plan were reviewed with the patient. Recent stressors were discussed with the patient. Events leading to admission were reviewed with the patient. Importance of medication and treatment compliance was reviewed with the patient. Inpatient Psychiatric Certification:     Certification: Based upon physical, mental and social evaluations, I certify that inpatient psychiatric services are medically necessary for this patient for a duration of 5-7 midnights (exact duration TBD pending patient's response to psychiatric treatment) for the diagnosis listed above. Available alternative community resources do not meet the patient's mental health care needs. I further attest that an established written individualized plan of care has been implemented and is outlined in the patient's medical records. This note has been constructed using a voice recognition system. There may be translation, syntax, or grammatical errors. If you have any questions, please contact the dictating provider. No bruits; no thyromegaly or nodules

## 2023-11-09 NOTE — PRE-ANESTHESIA EVALUATION ADULT - HEIGHT IN FEET
Behavioral Health Transition Record to Provider    Patient Name: Panfilo Martin  YOB: 2002  Medical Record Number: [id number]  Date of Admission: 11/5/2023  Date of Discharge: 11/13/2023    Attending Provider: Jay Willis MD  Discharging Provider: Dr Marilyn Arredondo  To contact this individual call [phone] and ask the  to page. If unavailable, ask to be transferred to [facility] Provider on call. [id number] Provider will be available on call 24/7 and during holidays. Primary Care Provider: None, None    No Known Allergies    Reason for Admission:  presents to the ER via EMS 12 hours pta after being found unresponsive in the back of an JESSHEIM.   Pt ETOH level upon arrival was 401     Admission Diagnosis: MDD (major depressive disorder), recurrent episode, mild (HCC) [A35.2]  Acute alcoholic intoxication without complication ([address]) [J08.172]  Major depression single episode, in partial remission ([address]) [F32.4]    * No surgery found *    Results for orders placed or performed during the hospital encounter of 11/05/23   COVID-19 & Influenza Combo    Specimen: Nasopharyngeal   Result Value Ref Range    SARS-CoV-2, PCR Not Detected Not Detected      Rapid Influenza A By PCR Not Detected Not Detected      Rapid Influenza B By PCR Not Detected Not Detected     Trichomonas Vaginali Rapid Antigen    Specimen: Vaginal swab   Result Value Ref Range    Trichomonas Rapid Ag Positive (A) Negative     CBC with Auto Differential   Result Value Ref Range    WBC 6.4 3.6 - 11.0 K/uL    RBC 4.68 3.80 - 5.20 M/uL    Hemoglobin 14.9 11.5 - 16.0 g/dL    Hematocrit 44.7 35.0 - 47.0 %    MCV 95.5 80.0 - 99.0 FL    MCH 31.8 26.0 - 34.0 PG    MCHC 33.3 30.0 - 36.5 g/dL    RDW 13.5 11.5 - 14.5 %    Platelets 420 580 - 748 K/uL    MPV 10.1 8.9 - 12.9 FL    Nucleated RBCs 0.0 0.0  WBC    nRBC 0.00 0.00 - 0.01 K/uL    Neutrophils % 52 32 - 75 %    Lymphocytes % 38 12 - 49 %    Monocytes % 8 5 - 13 % 5

## 2024-03-19 NOTE — H&P PST ADULT - BP NONINVASIVE SYSTOLIC (MM HG)
medications for this visit.        Patient Active Problem List   Diagnosis    Allergic rhinitis    Anxiety    Molluscum contagiosum    Early satiety       Past Medical History:   Diagnosis Date    Allergic rhinitis     Anxiety     Early satiety     Molluscum contagiosum        History reviewed. No pertinent surgical history.     Social History     Socioeconomic History    Marital status: Single     Spouse name: None    Number of children: None    Years of education: None    Highest education level: None   Tobacco Use    Smoking status: Never    Smokeless tobacco: Never   Vaping Use    Vaping Use: Never used   Substance and Sexual Activity    Alcohol use: Never        PHYSICAL EXAM     /80   Pulse 82   Ht 1.524 m (5')   Wt 45.2 kg (99 lb 9.6 oz)   SpO2 98%   BMI 19.45 kg/m²    General: A & O x3, No acute distress  Psych: making direct eye contact, answering questions on her own    ASSESSMENT/PLAN     1. Severe major depression (HCC)  -improved control of symptoms with opportunity to de-stress, counseling, prozac, and abilify.  Wait list for psychiatrist.  No changes at this time.  2. Generalized anxiety disorder       No orders of the defined types were placed in this encounter.      Return in about 4 weeks (around 4/16/2024) for ok to use acute visit if needed.        Electronically signed by Miracle Ng MD on 3/19/2024 at 11:58 AM   129

## 2024-06-19 NOTE — H&P PST ADULT - URINARY CATHETER
Incoming refill request on patient's medication:    No protocol for requested medication.    Medication: amphetamine-dextroamphetamine (ADDERALL) 20 MG tablet     Last office visit date: 3-    Pharmacy: [id number] - [place], [place] - [address]    Order pended, routed to clinician for review.      Prescriber's Follow Up Recommendation:  4 weeks     No Show/Late Cancels in Last 12 Months: none     Next Visit Date: 7/11/2024    Verified dosage(s) against: Medication list/Rx history     CONTROLLED:  Last Written Ordered date: 5-  Last PDMP Rx Dispense date (Sold date, if available): 5-     Action Taken: Routed to prescriber to advise/address further  
no

## 2024-08-23 ENCOUNTER — EMERGENCY (EMERGENCY)
Facility: HOSPITAL | Age: 34
LOS: 0 days | Discharge: HOME | End: 2024-08-24
Attending: STUDENT IN AN ORGANIZED HEALTH CARE EDUCATION/TRAINING PROGRAM
Payer: MEDICAID

## 2024-08-23 VITALS
DIASTOLIC BLOOD PRESSURE: 69 MMHG | OXYGEN SATURATION: 100 % | RESPIRATION RATE: 18 BRPM | HEIGHT: 65 IN | SYSTOLIC BLOOD PRESSURE: 109 MMHG | WEIGHT: 128.09 LBS | HEART RATE: 80 BPM | TEMPERATURE: 99 F

## 2024-08-23 DIAGNOSIS — Z3A.01 LESS THAN 8 WEEKS GESTATION OF PREGNANCY: ICD-10-CM

## 2024-08-23 DIAGNOSIS — Z86.2 PERSONAL HISTORY OF DISEASES OF THE BLOOD AND BLOOD-FORMING ORGANS AND CERTAIN DISORDERS INVOLVING THE IMMUNE MECHANISM: ICD-10-CM

## 2024-08-23 DIAGNOSIS — O99.891 OTHER SPECIFIED DISEASES AND CONDITIONS COMPLICATING PREGNANCY: ICD-10-CM

## 2024-08-23 DIAGNOSIS — R51.9 HEADACHE, UNSPECIFIED: ICD-10-CM

## 2024-08-23 DIAGNOSIS — O20.9 HEMORRHAGE IN EARLY PREGNANCY, UNSPECIFIED: ICD-10-CM

## 2024-08-23 DIAGNOSIS — N80.9 ENDOMETRIOSIS, UNSPECIFIED: ICD-10-CM

## 2024-08-23 PROCEDURE — 99283 EMERGENCY DEPT VISIT LOW MDM: CPT | Mod: 25

## 2024-08-23 PROCEDURE — 85025 COMPLETE CBC W/AUTO DIFF WBC: CPT

## 2024-08-23 PROCEDURE — 99284 EMERGENCY DEPT VISIT MOD MDM: CPT

## 2024-08-23 PROCEDURE — 80053 COMPREHEN METABOLIC PANEL: CPT

## 2024-08-23 PROCEDURE — 84702 CHORIONIC GONADOTROPIN TEST: CPT

## 2024-08-23 PROCEDURE — 86900 BLOOD TYPING SEROLOGIC ABO: CPT

## 2024-08-23 PROCEDURE — 86850 RBC ANTIBODY SCREEN: CPT

## 2024-08-23 PROCEDURE — 93005 ELECTROCARDIOGRAM TRACING: CPT

## 2024-08-23 PROCEDURE — 81001 URINALYSIS AUTO W/SCOPE: CPT

## 2024-08-23 PROCEDURE — 84703 CHORIONIC GONADOTROPIN ASSAY: CPT

## 2024-08-23 PROCEDURE — 86901 BLOOD TYPING SEROLOGIC RH(D): CPT

## 2024-08-23 PROCEDURE — 36415 COLL VENOUS BLD VENIPUNCTURE: CPT

## 2024-08-23 RX ORDER — IBUPROFEN 200 MG
600 TABLET ORAL ONCE
Refills: 0 | Status: DISCONTINUED | OUTPATIENT
Start: 2024-08-23 | End: 2024-08-24

## 2024-08-23 NOTE — ED ADULT TRIAGE NOTE - CHIEF COMPLAINT QUOTE
feels a sharp pain in left side head into her teeth, comes and goes and her tongue felt a little numb on the tip

## 2024-08-24 VITALS
SYSTOLIC BLOOD PRESSURE: 100 MMHG | DIASTOLIC BLOOD PRESSURE: 68 MMHG | RESPIRATION RATE: 18 BRPM | OXYGEN SATURATION: 99 % | HEART RATE: 78 BPM

## 2024-08-24 LAB
ALBUMIN SERPL ELPH-MCNC: 4.2 G/DL — SIGNIFICANT CHANGE UP (ref 3.5–5.2)
ALP SERPL-CCNC: 96 U/L — SIGNIFICANT CHANGE UP (ref 30–115)
ALT FLD-CCNC: 70 U/L — HIGH (ref 0–41)
ANION GAP SERPL CALC-SCNC: 11 MMOL/L — SIGNIFICANT CHANGE UP (ref 7–14)
APPEARANCE UR: ABNORMAL
AST SERPL-CCNC: 41 U/L — SIGNIFICANT CHANGE UP (ref 0–41)
BACTERIA # UR AUTO: NEGATIVE /HPF — SIGNIFICANT CHANGE UP
BASOPHILS # BLD AUTO: 0.07 K/UL — SIGNIFICANT CHANGE UP (ref 0–0.2)
BASOPHILS NFR BLD AUTO: 1 % — SIGNIFICANT CHANGE UP (ref 0–1)
BILIRUB SERPL-MCNC: 0.2 MG/DL — SIGNIFICANT CHANGE UP (ref 0.2–1.2)
BILIRUB UR-MCNC: NEGATIVE — SIGNIFICANT CHANGE UP
BLD GP AB SCN SERPL QL: SIGNIFICANT CHANGE UP
BUN SERPL-MCNC: 10 MG/DL — SIGNIFICANT CHANGE UP (ref 10–20)
CALCIUM SERPL-MCNC: 9.3 MG/DL — SIGNIFICANT CHANGE UP (ref 8.4–10.5)
CHLORIDE SERPL-SCNC: 102 MMOL/L — SIGNIFICANT CHANGE UP (ref 98–110)
CO2 SERPL-SCNC: 22 MMOL/L — SIGNIFICANT CHANGE UP (ref 17–32)
COLOR SPEC: YELLOW — SIGNIFICANT CHANGE UP
CREAT SERPL-MCNC: 0.9 MG/DL — SIGNIFICANT CHANGE UP (ref 0.7–1.5)
DIFF PNL FLD: NEGATIVE — SIGNIFICANT CHANGE UP
EGFR: 87 ML/MIN/1.73M2 — SIGNIFICANT CHANGE UP
EOSINOPHIL # BLD AUTO: 0.11 K/UL — SIGNIFICANT CHANGE UP (ref 0–0.7)
EOSINOPHIL NFR BLD AUTO: 1.5 % — SIGNIFICANT CHANGE UP (ref 0–8)
EPI CELLS # UR: SIGNIFICANT CHANGE UP
GLUCOSE SERPL-MCNC: 96 MG/DL — SIGNIFICANT CHANGE UP (ref 70–99)
GLUCOSE UR QL: NEGATIVE MG/DL — SIGNIFICANT CHANGE UP
HCG SERPL QL: POSITIVE
HCG SERPL-ACNC: 339.5 MIU/ML — HIGH
HCT VFR BLD CALC: 35.7 % — LOW (ref 37–47)
HGB BLD-MCNC: 12.1 G/DL — SIGNIFICANT CHANGE UP (ref 12–16)
IMM GRANULOCYTES NFR BLD AUTO: 0.1 % — SIGNIFICANT CHANGE UP (ref 0.1–0.3)
KETONES UR-MCNC: NEGATIVE MG/DL — SIGNIFICANT CHANGE UP
LEUKOCYTE ESTERASE UR-ACNC: ABNORMAL
LYMPHOCYTES # BLD AUTO: 3.03 K/UL — SIGNIFICANT CHANGE UP (ref 1.2–3.4)
LYMPHOCYTES # BLD AUTO: 41.6 % — SIGNIFICANT CHANGE UP (ref 20.5–51.1)
MCHC RBC-ENTMCNC: 30 PG — SIGNIFICANT CHANGE UP (ref 27–31)
MCHC RBC-ENTMCNC: 33.9 G/DL — SIGNIFICANT CHANGE UP (ref 32–37)
MCV RBC AUTO: 88.6 FL — SIGNIFICANT CHANGE UP (ref 81–99)
MONOCYTES # BLD AUTO: 0.57 K/UL — SIGNIFICANT CHANGE UP (ref 0.1–0.6)
MONOCYTES NFR BLD AUTO: 7.8 % — SIGNIFICANT CHANGE UP (ref 1.7–9.3)
NEUTROPHILS # BLD AUTO: 3.5 K/UL — SIGNIFICANT CHANGE UP (ref 1.4–6.5)
NEUTROPHILS NFR BLD AUTO: 48 % — SIGNIFICANT CHANGE UP (ref 42.2–75.2)
NITRITE UR-MCNC: NEGATIVE — SIGNIFICANT CHANGE UP
NRBC # BLD: 0 /100 WBCS — SIGNIFICANT CHANGE UP (ref 0–0)
PH UR: 7.5 — SIGNIFICANT CHANGE UP (ref 5–8)
PLATELET # BLD AUTO: 363 K/UL — SIGNIFICANT CHANGE UP (ref 130–400)
PMV BLD: 9.8 FL — SIGNIFICANT CHANGE UP (ref 7.4–10.4)
POTASSIUM SERPL-MCNC: 4.5 MMOL/L — SIGNIFICANT CHANGE UP (ref 3.5–5)
POTASSIUM SERPL-SCNC: 4.5 MMOL/L — SIGNIFICANT CHANGE UP (ref 3.5–5)
PROT SERPL-MCNC: 7 G/DL — SIGNIFICANT CHANGE UP (ref 6–8)
PROT UR-MCNC: NEGATIVE MG/DL — SIGNIFICANT CHANGE UP
RBC # BLD: 4.03 M/UL — LOW (ref 4.2–5.4)
RBC # FLD: 12.3 % — SIGNIFICANT CHANGE UP (ref 11.5–14.5)
RBC CASTS # UR COMP ASSIST: 2 /HPF — SIGNIFICANT CHANGE UP (ref 0–4)
SODIUM SERPL-SCNC: 135 MMOL/L — SIGNIFICANT CHANGE UP (ref 135–146)
SP GR SPEC: 1.02 — SIGNIFICANT CHANGE UP (ref 1–1.03)
UROBILINOGEN FLD QL: 1 MG/DL — SIGNIFICANT CHANGE UP (ref 0.2–1)
WBC # BLD: 7.29 K/UL — SIGNIFICANT CHANGE UP (ref 4.8–10.8)
WBC # FLD AUTO: 7.29 K/UL — SIGNIFICANT CHANGE UP (ref 4.8–10.8)
WBC UR QL: 4 /HPF — SIGNIFICANT CHANGE UP (ref 0–5)

## 2024-08-24 PROCEDURE — 93010 ELECTROCARDIOGRAM REPORT: CPT

## 2024-08-24 RX ORDER — ACETAMINOPHEN 500 MG
650 TABLET ORAL ONCE
Refills: 0 | Status: COMPLETED | OUTPATIENT
Start: 2024-08-24 | End: 2024-08-24

## 2024-08-24 RX ADMIN — Medication 650 MILLIGRAM(S): at 02:00

## 2024-08-24 NOTE — ED PROVIDER NOTE - NSFOLLOWUPCLINICS_GEN_ALL_ED_FT
[facility]  OB/GYN  [address] [place] [address]  Phone: [phone]  Fax:

## 2024-08-24 NOTE — ED PROVIDER NOTE - PHYSICAL EXAMINATION
CONST: NAD  EYES: PERRL, EOMI, Sclera and conjunctiva clear.   ENT: No nasal discharge. Oropharynx normal appearing.   NECK: Non-tender, no meningeal signs. normal ROM. supple   CARD: S1 S2; No jvd  RESP: Equal BS B/L, No wheezes, rhonchi or rales. No distress  GI: Soft, non-tender, non-distended. no cva tenderness. normal BS  MS: Normal ROM in all extremities. pulses 2 +. no calf tenderness or swelling  SKIN: Warm, dry, no acute rashes. Good turgor  NEURO: A&Ox3, No focal deficits. Strength 5/5 with no sensory deficits. Steady gait.

## 2024-08-24 NOTE — ED PROVIDER NOTE - CLINICAL SUMMARY MEDICAL DECISION MAKING FREE TEXT BOX
Patient presented with positive pregnancy test.  Patient has had recent cramping abdominal pain.  Recommended having an ultrasound done immediately however not available at this time and this current facility.  Recommended going to [place] ED for ultrasound or waiting until morning time for ultrasound availability at this current facility.  Patient reports that she feels better at this time and would like to follow-up outpatient with OB/GYN and will return if necessary.  Shared decision making was performed and patient was discharged.  Patient is stable at this time.

## 2024-08-24 NOTE — ED PROVIDER NOTE - OBJECTIVE STATEMENT
34-year-old female past medical history of endometriosis, anemia presents for evaluation of headache.  Patient endorses left-sided headache radiating to her teeth, aggravated with head movement, no relieving factors.  Patient also requesting pregnancy test.  Endorses intermittent vaginal bleeding over the past couple days.  Denies fever, vision change, chest pain, shortness of breath, abdominal pain, dysuria, hematuria, vomiting, diarrhea.

## 2024-08-24 NOTE — ED PROVIDER NOTE - PATIENT PORTAL LINK FT
You can access the FollowMyHealth Patient Portal offered by [facility] by registering at the following website: [email or web address]. By joining [employer]’s FollowMyHealth portal, you will also be able to view your health information using other applications (apps) compatible with our system.

## 2024-08-24 NOTE — ED PROVIDER NOTE - NSFOLLOWUPINSTRUCTIONS_ED_ALL_ED_FT
Go to [facility] ED or OBGYN as soon as possible for confirmatory ultrasound.    Pregnancy    WHAT YOU NEED TO KNOW:    A normal pregnancy lasts about 40 weeks. The first trimester lasts from your last period through the 12th week of pregnancy. The second trimester lasts from the 13th week of your pregnancy through the 23rd week. The third trimester lasts from your 24th week of pregnancy until your baby is born. If you know the date of your last period, your healthcare provider can estimate your due date. You may give birth to your baby any time from 37 weeks to 2 weeks after your due date.    DISCHARGE INSTRUCTIONS:    Return to the emergency department if:     You develop a severe headache that does not go away.      You have new or increased vision changes, such as blurred or spotted vision.      You have new or increased swelling in your face or hands.      You have pain or cramping in your abdomen or low back.      You have vaginal bleeding.    Contact your healthcare provider or obstetrician if:     You have abdominal cramps, pressure, or tightening.      You have a change in vaginal discharge.      You cannot keep food or drinks down, and you are losing weight.      You have chills or a fever.      You have vaginal itching, burning, or pain.       You have yellow, green, white, or foul-smelling vaginal discharge.      You have pain or burning when you urinate, less urine than usual, or pink or bloody urine.      You have questions or concerns about your condition or care.    Medicines:     Prenatal vitamins provide some of the extra vitamins and minerals you need during pregnancy. Prenatal vitamins may also help to decrease the risk of certain birth defects.       Take your medicine as directed. Contact your healthcare provider if you think your medicine is not helping or if you have side effects. Tell him or her if you are allergic to any medicine. Keep a list of the medicines, vitamins, and herbs you take. Include the amounts, and when and why you take them. Bring the list or the pill bottles to follow-up visits. Carry your medicine list with you in case of an emergency.    Follow up with your healthcare provider or obstetrician as directed: Go to all of your prenatal visits during your pregnancy. Write down your questions so you remember to ask them during your visits.    Body changes that may occur during your pregnancy:     Breast changes you will experience include tenderness and tingling during the early part of your pregnancy. Your breasts will become larger. You may need to use a support bra. You may see a thin, yellow fluid, called colostrum, leak from your nipples during the second trimester. Colostrum is a liquid that changes to milk about 3 days after you give birth.      Skin changes and stretch marks may occur during your pregnancy. You may have red marks, called stretch marks, on your skin. Stretch marks will usually fade after pregnancy. Use lotion if your skin is dry and itchy. The skin on your face, around your nipples, and below your belly button may darken. Most of the time, your skin will return to its normal color after your baby is born.       Morning sickness is nausea and vomiting that can happen at any time of day. Avoid fatty and spicy foods. Eat small meals throughout the day instead of large meals. [place] may help to decrease nausea. Ask your healthcare provider about other ways of decreasing nausea and vomiting.      Heartburn may be caused by changes in your hormones during pregnancy. Your growing uterus may also push your stomach upward and force stomach acid to back up into your esophagus. Eat 4 or 5 small meals each day instead of large meals. Avoid spicy foods. Avoid eating right before bedtime.      Constipation may develop during your pregnancy. To treat constipation, eat foods high in fiber such as fiber cereals, beans, fruits, vegetables, whole-grain breads, and prune juice. Get regular exercise and drink plenty of water. Your healthcare provider may also suggest a fiber supplement to soften your bowel movements. Talk to your healthcare provider before you use any medicines to decrease constipation.      Hemorrhoids are enlarged veins in the rectal area. They may cause pain, itching, and bright red bleeding from your rectum. To decrease your risk of hemorrhoids, prevent constipation and do not strain to have a bowel movement. If you have hemorrhoids, soak in a tub of warm water to ease discomfort. Ask your healthcare provider how you can treat hemorrhoids.       Leg cramps and swelling may be caused by low calcium levels or the added weight of pregnancy. Raise your legs above the level of your heart to decrease swelling. During a leg cramp, stretch or massage the muscle that has the cramp. Heat may help decrease pain and muscle spasms. Apply heat on your muscle for 20 to 30 minutes every 2 hours for as many days as directed.      Back pain may occur as your baby grows. Do not stand for long periods of time or lift heavy items. Use good posture while you stand, squat, or bend. Wear low-heeled shoes with good support. Rest may also help to relieve back pain. Ask your healthcare provider about exercises you can do to strengthen your back muscles.     Stay healthy during your pregnancy:     Eat a variety of healthy foods. Healthy foods include fruits, vegetables, whole-grain breads, low-fat dairy foods, beans, lean meats, and fish. Drink liquids as directed. Ask how much liquid to drink each day and which liquids are best for you. Limit caffeine to less than 200 milligrams each day. Limit your intake of fish to 2 servings each week. Choose fish low in mercury such as canned light tuna, shrimp, crab, salmon, cod, or tilapia. Do not eat fish high in mercury such as swordfish, tilefish, con mackerel, and shark.       Take prenatal vitamins as directed. Your need for certain vitamins and minerals, such as folic acid, increases during pregnancy. Prenatal vitamins provide some of the extra vitamins and minerals you need. Prenatal vitamins may also help to decrease the risk of certain birth defects.       Ask how much weight you should gain during your pregnancy. Too much or too little weight gain can be unhealthy for you and your baby.       Talk to your healthcare provider about exercise. Moderate exercise can help you stay fit. Your healthcare provider will help you plan an exercise program that is safe for you during pregnancy.       Do not smoke. Smoking increases your risk of a miscarriage and other health problems during your pregnancy. Smoking can cause your baby to be born too early or weigh less at birth. Quit smoking as soon as you think you might be pregnant. Ask your healthcare provider for information if you need help quitting.      Do not drink alcohol. Alcohol passes from your body to your baby through the placenta. It can affect your baby's brain development and cause fetal alcohol syndrome (FAS). FAS is a group of conditions that causes mental, behavior, and growth problems.       Talk to your healthcare provider before you take any medicines. Many medicines may harm your baby if you take them when you are pregnant. Do not take any medicines, vitamins, herbs, or supplements without first talking to your healthcare provider. Never use illegal or street drugs (such as marijuana or cocaine) while you are pregnant.     Safety tips:     Avoid hot tubs and saunas. Do not use a hot tub or sauna while you are pregnant, especially during your first trimester. Hot tubs and saunas may raise your baby's temperature and increase the risk of birth defects.      Avoid toxoplasmosis. This is an infection caused by eating raw meat or being around infected cat feces. It can cause birth defects, miscarriages, and other problems. Wash your hands after you touch raw meat. Make sure any meat is well-cooked before you eat it. Avoid raw eggs and unpasteurized milk. Use gloves or ask someone else to clean your cat's litter box while you are pregnant.       Ask your healthcare provider about travel. The most comfortable time to travel is during the second trimester. Ask your healthcare provider if you can travel after 36 weeks. You may not be able to travel in an airplane after 36 weeks. He may also recommend that you avoid long road trips.

## 2024-08-24 NOTE — ED ADULT NURSE NOTE - NSFALLUNIVINTERV_ED_ALL_ED
Bed/Stretcher in lowest position, wheels locked, appropriate side rails in place/Call bell, personal items and telephone in reach/Instruct patient to call for assistance before getting out of bed/chair/stretcher/Non-slip footwear applied when patient is off stretcher/Isabela to call system/Physically safe environment - no spills, clutter or unnecessary equipment/Purposeful proactive rounding/Room/bathroom lighting operational, light cord in reach

## 2024-09-20 ENCOUNTER — RESULT REVIEW (OUTPATIENT)
Age: 34
End: 2024-09-20

## 2024-09-20 ENCOUNTER — INPATIENT (INPATIENT)
Facility: HOSPITAL | Age: 34
LOS: 2 days | Discharge: HOME | DRG: 545 | End: 2024-09-23
Attending: OBSTETRICS & GYNECOLOGY | Admitting: OBSTETRICS & GYNECOLOGY
Payer: MEDICAID

## 2024-09-20 ENCOUNTER — TRANSCRIPTION ENCOUNTER (OUTPATIENT)
Age: 34
End: 2024-09-20

## 2024-09-20 VITALS
SYSTOLIC BLOOD PRESSURE: 124 MMHG | TEMPERATURE: 98 F | HEART RATE: 75 BPM | DIASTOLIC BLOOD PRESSURE: 70 MMHG | RESPIRATION RATE: 18 BRPM | OXYGEN SATURATION: 99 % | HEIGHT: 65 IN

## 2024-09-20 DIAGNOSIS — Z98.890 OTHER SPECIFIED POSTPROCEDURAL STATES: Chronic | ICD-10-CM

## 2024-09-20 DIAGNOSIS — I49.9 CARDIAC ARRHYTHMIA, UNSPECIFIED: ICD-10-CM

## 2024-09-20 LAB
ALBUMIN SERPL ELPH-MCNC: 4.2 G/DL — SIGNIFICANT CHANGE UP (ref 3.5–5.2)
ALP SERPL-CCNC: 82 U/L — SIGNIFICANT CHANGE UP (ref 30–115)
ALT FLD-CCNC: 21 U/L — SIGNIFICANT CHANGE UP (ref 0–41)
ANION GAP SERPL CALC-SCNC: 10 MMOL/L — SIGNIFICANT CHANGE UP (ref 7–14)
APPEARANCE UR: CLEAR — SIGNIFICANT CHANGE UP
AST SERPL-CCNC: 17 U/L — SIGNIFICANT CHANGE UP (ref 0–41)
BACTERIA # UR AUTO: ABNORMAL /HPF
BASOPHILS # BLD AUTO: 0.06 K/UL — SIGNIFICANT CHANGE UP (ref 0–0.2)
BASOPHILS NFR BLD AUTO: 0.8 % — SIGNIFICANT CHANGE UP (ref 0–1)
BILIRUB SERPL-MCNC: <0.2 MG/DL — SIGNIFICANT CHANGE UP (ref 0.2–1.2)
BILIRUB UR-MCNC: NEGATIVE — SIGNIFICANT CHANGE UP
BLD GP AB SCN SERPL QL: SIGNIFICANT CHANGE UP
BUN SERPL-MCNC: 12 MG/DL — SIGNIFICANT CHANGE UP (ref 10–20)
CALCIUM SERPL-MCNC: 9 MG/DL — SIGNIFICANT CHANGE UP (ref 8.4–10.5)
CHLORIDE SERPL-SCNC: 102 MMOL/L — SIGNIFICANT CHANGE UP (ref 98–110)
CO2 SERPL-SCNC: 25 MMOL/L — SIGNIFICANT CHANGE UP (ref 17–32)
COLOR SPEC: YELLOW — SIGNIFICANT CHANGE UP
CREAT SERPL-MCNC: 0.6 MG/DL — LOW (ref 0.7–1.5)
DIFF PNL FLD: ABNORMAL
EGFR: 121 ML/MIN/1.73M2 — SIGNIFICANT CHANGE UP
EOSINOPHIL # BLD AUTO: 0.14 K/UL — SIGNIFICANT CHANGE UP (ref 0–0.7)
EOSINOPHIL NFR BLD AUTO: 1.9 % — SIGNIFICANT CHANGE UP (ref 0–8)
EPI CELLS # UR: PRESENT
GLUCOSE SERPL-MCNC: 96 MG/DL — SIGNIFICANT CHANGE UP (ref 70–99)
GLUCOSE UR QL: NEGATIVE MG/DL — SIGNIFICANT CHANGE UP
HCG SERPL-ACNC: 6303 MIU/ML — HIGH
HCT VFR BLD CALC: 33.2 % — LOW (ref 37–47)
HGB BLD-MCNC: 11.4 G/DL — LOW (ref 12–16)
IMM GRANULOCYTES NFR BLD AUTO: 0.1 % — SIGNIFICANT CHANGE UP (ref 0.1–0.3)
KETONES UR-MCNC: NEGATIVE MG/DL — SIGNIFICANT CHANGE UP
LEUKOCYTE ESTERASE UR-ACNC: ABNORMAL
LYMPHOCYTES # BLD AUTO: 2.78 K/UL — SIGNIFICANT CHANGE UP (ref 1.2–3.4)
LYMPHOCYTES # BLD AUTO: 36.9 % — SIGNIFICANT CHANGE UP (ref 20.5–51.1)
MCHC RBC-ENTMCNC: 30.3 PG — SIGNIFICANT CHANGE UP (ref 27–31)
MCHC RBC-ENTMCNC: 34.3 G/DL — SIGNIFICANT CHANGE UP (ref 32–37)
MCV RBC AUTO: 88.3 FL — SIGNIFICANT CHANGE UP (ref 81–99)
MONOCYTES # BLD AUTO: 0.55 K/UL — SIGNIFICANT CHANGE UP (ref 0.1–0.6)
MONOCYTES NFR BLD AUTO: 7.3 % — SIGNIFICANT CHANGE UP (ref 1.7–9.3)
NEUTROPHILS # BLD AUTO: 3.99 K/UL — SIGNIFICANT CHANGE UP (ref 1.4–6.5)
NEUTROPHILS NFR BLD AUTO: 53 % — SIGNIFICANT CHANGE UP (ref 42.2–75.2)
NITRITE UR-MCNC: NEGATIVE — SIGNIFICANT CHANGE UP
NRBC # BLD: 0 /100 WBCS — SIGNIFICANT CHANGE UP (ref 0–0)
PH UR: 7.5 — SIGNIFICANT CHANGE UP (ref 5–8)
PLATELET # BLD AUTO: 312 K/UL — SIGNIFICANT CHANGE UP (ref 130–400)
PMV BLD: 9.7 FL — SIGNIFICANT CHANGE UP (ref 7.4–10.4)
POTASSIUM SERPL-MCNC: 4.2 MMOL/L — SIGNIFICANT CHANGE UP (ref 3.5–5)
POTASSIUM SERPL-SCNC: 4.2 MMOL/L — SIGNIFICANT CHANGE UP (ref 3.5–5)
PROT SERPL-MCNC: 6.5 G/DL — SIGNIFICANT CHANGE UP (ref 6–8)
PROT UR-MCNC: NEGATIVE MG/DL — SIGNIFICANT CHANGE UP
RBC # BLD: 3.76 M/UL — LOW (ref 4.2–5.4)
RBC # FLD: 12.2 % — SIGNIFICANT CHANGE UP (ref 11.5–14.5)
RBC CASTS # UR COMP ASSIST: 200 /HPF — HIGH (ref 0–4)
SODIUM SERPL-SCNC: 137 MMOL/L — SIGNIFICANT CHANGE UP (ref 135–146)
SP GR SPEC: 1.01 — SIGNIFICANT CHANGE UP (ref 1–1.03)
UROBILINOGEN FLD QL: 0.2 MG/DL — SIGNIFICANT CHANGE UP (ref 0.2–1)
WBC # BLD: 7.53 K/UL — SIGNIFICANT CHANGE UP (ref 4.8–10.8)
WBC # FLD AUTO: 7.53 K/UL — SIGNIFICANT CHANGE UP (ref 4.8–10.8)
WBC UR QL: 5 /HPF — SIGNIFICANT CHANGE UP (ref 0–5)

## 2024-09-20 PROCEDURE — C9399: CPT

## 2024-09-20 PROCEDURE — C1889: CPT

## 2024-09-20 PROCEDURE — 88305 TISSUE EXAM BY PATHOLOGIST: CPT

## 2024-09-20 PROCEDURE — 76830 TRANSVAGINAL US NON-OB: CPT | Mod: 26

## 2024-09-20 PROCEDURE — 99285 EMERGENCY DEPT VISIT HI MDM: CPT

## 2024-09-20 RX ORDER — SODIUM CHLORIDE IRRIG SOLUTION 0.9 %
1000 SOLUTION, IRRIGATION IRRIGATION ONCE
Refills: 0 | Status: COMPLETED | OUTPATIENT
Start: 2024-09-20 | End: 2024-09-20

## 2024-09-20 RX ORDER — ACETAMINOPHEN 325 MG
975 TABLET ORAL ONCE
Refills: 0 | Status: COMPLETED | OUTPATIENT
Start: 2024-09-20 | End: 2024-09-20

## 2024-09-20 RX ADMIN — Medication 1000 MILLILITER(S): at 20:59

## 2024-09-20 RX ADMIN — Medication 975 MILLIGRAM(S): at 20:59

## 2024-09-20 RX ADMIN — Medication 975 MILLIGRAM(S): at 18:46

## 2024-09-20 RX ADMIN — Medication 1000 MILLILITER(S): at 18:46

## 2024-09-20 NOTE — H&P ADULT - HISTORY OF PRESENT ILLNESS
Chief Complaint:: abnormal pregnancy     HPI: 34y  @5w5d by LMP, presenting for PUL.  Patient had been seen in ED twice in August,  339.5 -->  886.5. Seen by PMD today unable to see a pregnancy in uterus, could not evaluate the adnexa.  Patient presented to [facility] ED, found to have bchg 6303 and sonographic findings concerning for ectopic pregnancy.  Patient is relatively asymptomatic, c/o mild lower abdominal cramping and back pain.  Also experience mild to moderate vaginal bleeding today.  Denies severe abdominal pain, n/v, dizziness/lightheadedness.  Denies chest pain, shortness of breath.  H/o endometriosis, s/p left ovarian cystectomy with endometrioma.      Ob/Gyn History:     FT NSVDx3  Denies history of ovarian cysts, uterine fibroids, abnormal paps, or STIs  h/o endometrioma, and endometriosis, s/p cystectomy 2020

## 2024-09-20 NOTE — H&P ADULT - NSHPPHYSICALEXAM_GEN_ALL_CORE
Vital Signs Last 24 Hrs  T(C): 36.9 (20 Sep 2024 22:24), Max: 36.9 (20 Sep 2024 22:24)  T(F): 98.5 (20 Sep 2024 22:24), Max: 98.5 (20 Sep 2024 22:24)  HR: 78 (20 Sep 2024 22:24) (71 - 78)  BP: 116/72 (20 Sep 2024 22:24) (104/68 - 124/70)  BP(mean): --  RR: 18 (20 Sep 2024 22:24) (16 - 18)  SpO2: 99% (20 Sep 2024 22:24) (99% - 99%)    Parameters below as of 20 Sep 2024 22:24  Patient On (Oxygen Delivery Method): room air    Gen: AAOx3, NAD  Abd: soft, nontender, nondistended  Pelvic: normal ext genitalia, 10cc vaginal blood, normal vagina and cervix, cervix not dilated.   8wk sized uterus, mobile, nontender  Adnexa - R side tenderness, no palpable masses or fullness

## 2024-09-20 NOTE — ED ADULT NURSE NOTE - OBJECTIVE STATEMENT
pt in er for complaints of lower abdominal cramping and spotting since this morning   pt is 5 weeks pregnant

## 2024-09-20 NOTE — H&P ADULT - ASSESSMENT
34y , w/ h/o laparoscopic cystectomy and endometriosis, now with ectopic pregnancy, hemodynamically stable.     - Admit to Gyn   - NPO/IVF hydration   - Discussed risks of ectopic pregnancy including growth of ectopic pregnancy, rupture leading to uncontrolled internal bleeding, pain, hemodynamic instability.  Discussed that with a beta hcg > 5000 is a relative contraindication to MTX management and the recommended management would be surgical removal of ectopic pregnancy. Discussed r/b/a of surgery with patient including bleeding, infection, damage to surrounding organs.  Patient amenable to surgical management, consent signed, and patient added on to OR schedule.   - Tylenol/Ibuprofen for pain mgmt     D/w Dr. Forde

## 2024-09-20 NOTE — ED PROVIDER NOTE - PROGRESS NOTE DETAILS
Patient found to have right adnexal ectopic pregnancy.  Discussed with patient's GYN Dr. Escobar, recs [facility] for further eval/potential methotrexate. tom Archer and Dr. Fuentes at [facility] for transfer.

## 2024-09-20 NOTE — H&P ADULT - NSHPLABSRESULTS_GEN_ALL_CORE
LABS:                        11.4   7.53  )-----------( 312      ( 20 Sep 2024 19:22 )             33.2     HCG Quantitative, Serum: 6303.0 mIU/mL (24 @ 19:22)    ABO RH Interpretation: O POS (24 @ 19:22)  Antibody Screen: NEG (24 @ 19:22)        137  |  102  |  12  ----------------------------<  96  4.2   |  25  |  0.6[L]    Ca    9.0      20 Sep 2024 19:22    TPro  6.5  /  Alb  4.2  /  TBili  <0.2  /  DBili  x   /  AST  17  /  ALT  21  /  AlkPhos  82        Urinalysis Basic - ( 20 Sep 2024 19:42 )    Color: Yellow / Appearance: Clear / S.009 / pH: x  Gluc: x / Ketone: Negative mg/dL  / Bili: Negative / Urobili: 0.2 mg/dL   Blood: x / Protein: Negative mg/dL / Nitrite: Negative   Leuk Esterase: Trace / RBC: 200 /HPF / WBC 5 /HPF   Sq Epi: x / Non Sq Epi: x / Bacteria: Few /HPF          RADIOLOGY & ADDITIONAL STUDIES:  < from: US Transvaginal (24 @ 20:22) >      ACC: [id number] EXAM:  US TRANSVAGINAL   ORDERED BY: DONNY CARRANZA     PROCEDURE DATE:  2024          INTERPRETATION:  CLINICAL INFORMATION: Bleeding. Pregnant.    LMP: 2024    COMPARISON: 2024.    TECHNIQUE:  Endovaginal and transabdominalpelvic sonogram. Color and Spectral   Doppler was performed.    FINDINGS:  Uterus: 8.7 cm x 5.0 cm x 5.9 cm. Uterine fibroid measures up to 1.2 cm.  Endometrium: 10 mm. No intrauterine gestation.    Right ovary: 4.6 cm x 2.6 cm x 2.9 cm. Interval decrease in the right   hemorrhagic cyst, measuring up to 2.5 cm, previously measuring up to 4.7   cm. Right corpus luteal cyst present. Ectopic pregnancy in the right   adnexa with gestational sac measuring 2.0 cm and fetal pole measuring up   to 0.6 cm.    Left ovary: 2.7 cm x 2.0 cm x 1.9 cm. Within normal limits.    Fluid: Trace fluid adjacent to the right adnexa.    IMPRESSION:    1. Ectopic pregnancy in the right adnexa with trace adjacent fluid.  2. Interval decrease in the right ovarian hemorrhagic cyst.  3. Uterine fibroid.    Spoke with DONNY CARRANZA MD on 2024 8:25 PM with readback.    --- End of Report ---            RIVER RAYO MD; Attending Radiologist  This document has been electronically signed. Sep 20 2024  8:28PM    < end of copied text >

## 2024-09-20 NOTE — ED PROVIDER NOTE - OBJECTIVE STATEMENT
Patient c/o LLQ pain , approx 5 wks preg, no IUP seen so far, Noted increasing LLQ pain, mild vaginal bleeding, no tissue no clots,  Sent by PMD to r/o ectopic

## 2024-09-20 NOTE — ED PROVIDER NOTE - ATTENDING APP SHARED VISIT CONTRIBUTION OF CARE
34-year-old female approximately 5 weeks pregnant, LMP August 11 presenting for evaluation of pelvic pain, cramping, vaginal bleeding since yesterday.  Follows with Dr. Shira Escobar, has not had confirmed IUP for this pregnancy yet, was told to come in for further evaluation to rule out ectopic.  Mild associated headache.  No other acute complaints.  Well appearing, NAD, non toxic. NCAT PERRLA EOMI neck supple non tender normal wob cta bl rrr abdomen s nt nd no rebound no guarding WWPx4 neuro non focal Home

## 2024-09-20 NOTE — ED PROVIDER NOTE - CLINICAL SUMMARY MEDICAL DECISION MAKING FREE TEXT BOX
Ndubuisi: Signout received from signout team.  Patient was about 5 weeks pregnant presented with vaginal bleeding and cramping.  Required labs and imaging and was found to have an ectopic pregnancy.  Starting consulted with OB and they recommended transfer.  Will be team consulted, pending evaluation and recommendation and will manage accordingly.

## 2024-09-21 VITALS
SYSTOLIC BLOOD PRESSURE: 104 MMHG | RESPIRATION RATE: 16 BRPM | DIASTOLIC BLOOD PRESSURE: 61 MMHG | OXYGEN SATURATION: 100 % | HEART RATE: 68 BPM

## 2024-09-21 DIAGNOSIS — O00.90 UNSPECIFIED ECTOPIC PREGNANCY WITHOUT INTRAUTERINE PREGNANCY: ICD-10-CM

## 2024-09-21 PROCEDURE — 88305 TISSUE EXAM BY PATHOLOGIST: CPT | Mod: 26

## 2024-09-21 PROCEDURE — 59151 TREAT ECTOPIC PREGNANCY: CPT

## 2024-09-21 RX ORDER — HYDROMORPHONE HYDROCHLORIDE 1 MG/ML
0.5 INJECTION, SOLUTION INTRAMUSCULAR; INTRAVENOUS; SUBCUTANEOUS
Refills: 0 | Status: DISCONTINUED | OUTPATIENT
Start: 2024-09-21 | End: 2024-09-23

## 2024-09-21 RX ORDER — OXYCODONE HYDROCHLORIDE 30 MG/1
1 TABLET, FILM COATED, EXTENDED RELEASE ORAL
Qty: 12 | Refills: 0
Start: 2024-09-21 | End: 2024-09-23

## 2024-09-21 RX ORDER — MORPHINE SULFATE 30 MG/1
2 TABLET, FILM COATED, EXTENDED RELEASE ORAL ONCE
Refills: 0 | Status: DISCONTINUED | OUTPATIENT
Start: 2024-09-21 | End: 2024-09-23

## 2024-09-21 RX ORDER — ONDANSETRON HCL/PF 4 MG/2 ML
4 VIAL (ML) INJECTION ONCE
Refills: 0 | Status: DISCONTINUED | OUTPATIENT
Start: 2024-09-21 | End: 2024-09-23

## 2024-09-21 RX ORDER — ACETAMINOPHEN 325 MG
2 TABLET ORAL
Qty: 56 | Refills: 0
Start: 2024-09-21 | End: 2024-09-27

## 2024-09-21 RX ORDER — SODIUM CHLORIDE IRRIG SOLUTION 0.9 %
1000 SOLUTION, IRRIGATION IRRIGATION
Refills: 0 | Status: DISCONTINUED | OUTPATIENT
Start: 2024-09-21 | End: 2024-09-21

## 2024-09-21 RX ORDER — KETOROLAC TROMETHAMINE 10 MG/1
15 TABLET, FILM COATED ORAL EVERY 6 HOURS
Refills: 0 | Status: DISCONTINUED | OUTPATIENT
Start: 2024-09-21 | End: 2024-09-21

## 2024-09-21 RX ORDER — SODIUM CHLORIDE IRRIG SOLUTION 0.9 %
1000 SOLUTION, IRRIGATION IRRIGATION
Refills: 0 | Status: DISCONTINUED | OUTPATIENT
Start: 2024-09-21 | End: 2024-09-23

## 2024-09-21 NOTE — ASU DISCHARGE PLAN (ADULT/PEDIATRIC) - ASU DC SPECIAL INSTRUCTIONSFT
600mg ibuprofen every 6 hours, 325-975mg Tylenol every 6 hours as needed.  For optimal pain control alternate ibuprofen and Tylenol every 3 hours.  Oxycodone sent for severe pain.     Take Colace BID x 7 days, Simethicone every 4 hours for gas pain.

## 2024-09-21 NOTE — BRIEF OPERATIVE NOTE - NSICDXBRIEFPROCEDURE_GEN_ALL_CORE_FT
PROCEDURES:  Laparoscopic right salpingectomy for ectopic pregnancy 21-Sep-2024 03:26:58  Lora Correia

## 2024-09-21 NOTE — BRIEF OPERATIVE NOTE - OPERATION/FINDINGS
1cm dilated cervical os, normal vagina   R ectopic pregnancy in distal tube c/w ectopic pregnancy  L dilated proximal tube, normal ovaries bilaterally   Signs of endometriosis scarring along posterior aspect of uterus

## 2024-09-21 NOTE — ASU DISCHARGE PLAN (ADULT/PEDIATRIC) - CARE PROVIDER_API CALL
Christiano Forde.  Obstetrics and Gynecology  [address] [place] [address]  Phone: [phone]  Fax: [phone]  Follow Up Time:

## 2024-09-22 LAB
CULTURE RESULTS: SIGNIFICANT CHANGE UP
SPECIMEN SOURCE: SIGNIFICANT CHANGE UP

## 2024-09-23 DIAGNOSIS — O00.90 UNSPECIFIED. ECTOPIC. PREGNANCY WITHOUT INTRAUTERINE PREGNANCY: ICD-10-CM

## 2024-09-23 NOTE — CHART NOTE - NSCHARTNOTEFT_GEN_A_CORE
PGY 2 Note    Patient called to assess well being s/p left salpingectomy for ectopic pregnancy possible corneal on 09/20. Patient informed to get repeat beta on 09/23 to assess PGY 2 Note    Patient called to assess well being s/p left salpingectomy for ectopic pregnancy possible corneal on 09/20. States she is recovering well, no complaints. Denies any fevers/chills, incision healing well, abdominal pain, or vaginal bleeding. Patient informed to get repeat beta on 09/23 to assess

## 2024-09-24 LAB — SURGICAL PATHOLOGY STUDY: SIGNIFICANT CHANGE UP

## 2024-09-30 NOTE — CHART NOTE - NSCHARTNOTEFT_GEN_A_CORE
PGY 2 Note    Patient called to inquire about wellbeing and to remind [facility] about [facility]. States she is doing well, has some abdominal soreness. Aware that she needs to get labwork done today, will go.

## 2024-10-03 NOTE — CHART NOTE - NSCHARTNOTEFT_GEN_A_CORE
PGY-2 Note/ GYN    34y G4 now  presented for cramping on 9/20 found to have an ectopic pregnancy and underwent a right salpingectomy. bhcg on presentation was 6303.     bhcg trend  9/23 hcg 992 (84% decrease)  9/30 62 (93.8% decrease) (per Dr. Borjas)     TVUS IMPRESSION on 9/20:  1. Ectopic pregnancy in the right adnexa with trace adjacent fluid.  2. Interval decrease in the right ovarian hemorrhagic cyst.  3. Uterine fibroid.    Pathology s/p right salpingectomy  Right salpingectomy path: Right fallopian tube, laparoscopic salpingectomy: Intra fallopian tubal chorionic villi and decidua with hematoma, ectopic pregnancy clinically.    At this time, patient's bhcg is decreasing appropriately and patient will be taken off the beta list. She will be following up with Dr. Archer and Dr. Borjas outpatient. All questions answered, patient currently asymptomatic.

## 2024-10-04 DIAGNOSIS — N80.9 ENDOMETRIOSIS, UNSPECIFIED: ICD-10-CM

## 2024-10-04 DIAGNOSIS — O00.101 RIGHT TUBAL PREGNANCY WITHOUT INTRAUTERINE PREGNANCY: ICD-10-CM

## 2024-10-22 ENCOUNTER — NON-APPOINTMENT (OUTPATIENT)
Age: 34
End: 2024-10-22

## 2024-10-31 ENCOUNTER — NON-APPOINTMENT (OUTPATIENT)
Age: 34
End: 2024-10-31

## 2025-02-26 ENCOUNTER — NON-APPOINTMENT (OUTPATIENT)
Age: 35
End: 2025-02-26

## 2025-07-10 NOTE — END OF VISIT
Message left instructing on procedure, arrival time, npo status, meds with restrictions, and transportation.  Office number left for pt to return call.   [Time Spent: ___ minutes] : I have spent [unfilled] minutes of face to face time with the patient [>50% of Time Spent on Counseling for ____] : Greater than 50% of the encounter time was spent on counseling for [unfilled]